# Patient Record
Sex: MALE | Race: BLACK OR AFRICAN AMERICAN | NOT HISPANIC OR LATINO | Employment: FULL TIME | URBAN - METROPOLITAN AREA
[De-identification: names, ages, dates, MRNs, and addresses within clinical notes are randomized per-mention and may not be internally consistent; named-entity substitution may affect disease eponyms.]

---

## 2017-02-03 RX ORDER — PREDNISONE 10 MG/1
TABLET ORAL
Qty: 35 TABLET | Refills: 0 | Status: SHIPPED | OUTPATIENT
Start: 2017-02-03 | End: 2017-05-02 | Stop reason: SDUPTHER

## 2017-02-03 NOTE — TELEPHONE ENCOUNTER
----- Message from Josi Francisco MA sent at 2/3/2017  8:49 AM CST -----  Contact: Self/[phone]  Type: Rx    Name of medication(s): Prednisone    Is this a refill? New rx?refilll    Who prescribed medication?    [facility]arm[facility] Phone[facility]    Comments:Pt stated that his sinus is flared and that he really need his medication. Please advise.    Thanks

## 2017-02-13 ENCOUNTER — OFFICE VISIT (OUTPATIENT)
Dept: ALLERGY | Facility: CLINIC | Age: 37
End: 2017-02-13
Payer: COMMERCIAL

## 2017-02-13 VITALS
BODY MASS INDEX: 25.56 KG/M2 | DIASTOLIC BLOOD PRESSURE: 70 MMHG | HEIGHT: 73 IN | WEIGHT: 192.88 LBS | SYSTOLIC BLOOD PRESSURE: 102 MMHG | HEART RATE: 64 BPM

## 2017-02-13 DIAGNOSIS — J33.9 NASAL POLYPOSIS: ICD-10-CM

## 2017-02-13 DIAGNOSIS — J45.20 ASTHMA, INTERMITTENT, UNCOMPLICATED: Primary | ICD-10-CM

## 2017-02-13 DIAGNOSIS — H10.13 CONJUNCTIVITIS, ALLERGIC, BILATERAL: ICD-10-CM

## 2017-02-13 DIAGNOSIS — J30.89 NON-SEASONAL ALLERGIC RHINITIS DUE TO OTHER ALLERGIC TRIGGER: ICD-10-CM

## 2017-02-13 PROCEDURE — 99999 PR PBB SHADOW E&M-EST. PATIENT-LVL III: CPT | Mod: PBBFAC,,, | Performed by: ALLERGY & IMMUNOLOGY

## 2017-02-13 PROCEDURE — 99214 OFFICE O/P EST MOD 30 MIN: CPT | Mod: S$GLB,,, | Performed by: ALLERGY & IMMUNOLOGY

## 2017-02-13 NOTE — PROGRESS NOTES
Sarthak Caballero returns to clinic today for continued evaluation of allergic rhinitis, conjunctivitis, asthma, and is polyposis. He was last seen November 17, 2016.    Since then he has been taking Oralair daily. He has tolerated this well without any difficulty and is symptoms have improved.     He is having very little rhinitis. He is able to breathe through his nose. At the very beginning of February he had a few days where he was not able to do so.    He has continued his Advair. He has not needed any albuterol. He denies any cough, wheezing, or shortness of breath.    His conjunctivitis has been well controlled.    He has an EpiPen at home. He thinks that it is up-to-date.    He continues to use his budesonide rinses daily.    [facility] ALLERGY QUESTIONNAIRE SHORT 2/13/2017   Are you taking any new medications since your last visit? No   Constitution: No symptoms   Head or facial pain: Sinus pressure   Eyes: No symptoms   Ears: Itching, Fullness   Nose: Nasal polyps, Blocked nose   Throat: No symptoms   Sinuses: No symptoms   Lungs: Use of inhalers   Skin: No symptoms   Cardiovascular: No symptoms   Gastrointestinal: No symptoms   Genital/ urinary No symptoms   Musculoskeletal: No symptoms   Neurologic: No symptoms   Endocrine: No symptoms   Hematologic: No symptoms     Physical Examination:  General: Well-developed, well-nourished, no acute distress.  Head: No sinus tenderness.  Eyes: Conjunctivae:  No bulbar or palpebral conjunctival injection.  Ears: EAC's clear.  TM's clear.  No pre-auricular nodes.  Nose: Nasal Mucosa:  Pink.  Septum: No apparent deviation.  Turbinates:  Normal.  Polyps/Mass: Bilateral mucus present.  Teeth/Gums:  No bleeding noted.  Oropharynx: No exudates.  Neck: Supple without thyromegaly. No cervical lymphadenopathy.    Respiratory/Chest: Effort: Good.  Auscultation:  Clear bilaterally.  Skin: Good turgor.  No urticaria or angioedema.  Neuro/Psych: Oriented x 3.    Laboratory  2014:  IgE level:  467.  ImmunoCAP:  Class IV:  Bahia.  Class III:  Vincent, Pecan.  Class II:  Oak, RW.  Class I:  DM, ME.    IgA:  127.  Ig.  IgM:  82.  Pneumococcal titers: Not protective.    Pneumovax was given on 2014.    Laboratory 3/11/2014:  Pneumococcal titers: Improved but not protected.    Laboratory 2014:  Pneumococcal titers: Protective.    Assessment:  1. Allergic rhinitis.  2. Allergic conjunctivitis.  3. Allergic asthma, controlled.  4. Nasal polyposis.  5. S/P FESS with polypectomy 2013 and 2015.  6. Low pneumococcal titer with Pneumovax 2014, protective with Prevnar 3/25/2014.    Recommendations:  1. Continue Oralair.  2. Continue budesonide nasal rinses.  3. Continue Advair.  4. OTC antihistamines as needed.  5. Albuterol as needed.  6. Return to clinic in the months or sooner if needed.

## 2017-05-01 ENCOUNTER — TELEPHONE (OUTPATIENT)
Dept: ALLERGY | Facility: CLINIC | Age: 37
End: 2017-05-01

## 2017-05-01 NOTE — TELEPHONE ENCOUNTER
Called patient, left msg on voicemail that Dr. Nieto will be in tomorrow and I will call him after discussing with Dr. Nieto to see if he can come in at 4:40 tomorrow.  Wanted to find out what his symptoms were as well.

## 2017-05-02 ENCOUNTER — HOSPITAL ENCOUNTER (OUTPATIENT)
Dept: RADIOLOGY | Facility: HOSPITAL | Age: 37
Discharge: HOME | End: 2017-05-02
Attending: ALLERGY & IMMUNOLOGY
Payer: COMMERCIAL

## 2017-05-02 ENCOUNTER — OFFICE VISIT (OUTPATIENT)
Dept: ALLERGY | Facility: CLINIC | Age: 37
End: 2017-05-02
Payer: COMMERCIAL

## 2017-05-02 VITALS
WEIGHT: 197.56 LBS | BODY MASS INDEX: 26.18 KG/M2 | SYSTOLIC BLOOD PRESSURE: 110 MMHG | HEIGHT: 73 IN | HEART RATE: 84 BPM | DIASTOLIC BLOOD PRESSURE: 68 MMHG

## 2017-05-02 DIAGNOSIS — J30.89 NON-SEASONAL ALLERGIC RHINITIS DUE TO OTHER ALLERGIC TRIGGER: ICD-10-CM

## 2017-05-02 DIAGNOSIS — J33.9 NASAL POLYPOSIS: ICD-10-CM

## 2017-05-02 DIAGNOSIS — R05.9 COUGH: ICD-10-CM

## 2017-05-02 DIAGNOSIS — J45.901 ASTHMA WITH ACUTE EXACERBATION, UNSPECIFIED ASTHMA SEVERITY: ICD-10-CM

## 2017-05-02 DIAGNOSIS — J45.20 ASTHMA, INTERMITTENT, UNCOMPLICATED: ICD-10-CM

## 2017-05-02 DIAGNOSIS — H10.13 CONJUNCTIVITIS, ALLERGIC, BILATERAL: ICD-10-CM

## 2017-05-02 DIAGNOSIS — J06.9 UPPER RESPIRATORY TRACT INFECTION, UNSPECIFIED TYPE: ICD-10-CM

## 2017-05-02 DIAGNOSIS — R05.9 COUGH: Primary | ICD-10-CM

## 2017-05-02 DIAGNOSIS — J40 BRONCHITIS: ICD-10-CM

## 2017-05-02 PROCEDURE — 99999 PR PBB SHADOW E&M-EST. PATIENT-LVL III: CPT | Mod: 25,PBBFAC,, | Performed by: ALLERGY & IMMUNOLOGY

## 2017-05-02 PROCEDURE — 99214 OFFICE O/P EST MOD 30 MIN: CPT | Mod: S$GLB,,, | Performed by: ALLERGY & IMMUNOLOGY

## 2017-05-02 PROCEDURE — 71020 XR CHEST PA AND LATERAL: CPT | Mod: 26,,, | Performed by: RADIOLOGY

## 2017-05-02 PROCEDURE — 71020 XR CHEST PA AND LATERAL: CPT | Mod: TC

## 2017-05-02 PROCEDURE — 1160F RVW MEDS BY RX/DR IN RCRD: CPT | Mod: S$GLB,,, | Performed by: ALLERGY & IMMUNOLOGY

## 2017-05-02 RX ORDER — ALBUTEROL SULFATE 90 UG/1
2 AEROSOL, METERED RESPIRATORY (INHALATION) EVERY 4 HOURS PRN
Qty: 1 INHALER | Refills: 3 | Status: SHIPPED | OUTPATIENT
Start: 2017-05-02 | End: 2020-03-04 | Stop reason: SDUPTHER

## 2017-05-02 RX ORDER — PREDNISONE 10 MG/1
TABLET ORAL
Qty: 35 TABLET | Refills: 0 | Status: SHIPPED | OUTPATIENT
Start: 2017-05-02 | End: 2018-11-21

## 2017-05-02 RX ORDER — AMOXICILLIN AND CLAVULANATE POTASSIUM 875; 125 MG/1; MG/1
1 TABLET, FILM COATED ORAL EVERY 12 HOURS
Qty: 20 TABLET | Refills: 1 | Status: SHIPPED | OUTPATIENT
Start: 2017-05-02 | End: 2018-02-20

## 2017-05-02 RX ORDER — FLUTICASONE PROPIONATE AND SALMETEROL 500; 50 UG/1; UG/1
1 POWDER RESPIRATORY (INHALATION) 2 TIMES DAILY
Qty: 1 INHALER | Refills: 5 | Status: SHIPPED | OUTPATIENT
Start: 2017-05-02 | End: 2018-05-16 | Stop reason: SDUPTHER

## 2017-05-02 NOTE — PROGRESS NOTES
Sarthak Caballero returns to clinic today for continued evaluation of allergic rhinitis, conjunctivitis, asthma, and nasal polyposis. He was last seen February 13, 2017.    Since his last visit, he has been taking Oralair without any difficulty. He does think that this has helped. His rhinitis and conjunctivitis are better. He is tolerating this without any difficulty.    About 2 weeks ago he developed an upper respiratory infection with a cough that was productive of brown sputum. He then developed increased wheezing and shortness of breath.    He says that his rhinitis and conjunctivitis have been controlled. He is not having any nasal congestion.    He also had myalgias, fatigue, subjective fever, and chills that lasted several days. He attributed this to the change of weather.    He has been taking Symbicort twice a day.    He only had 9 puffs of albuterol and wanted to save it rather than take it.    He has been taking NyQuil instead.    He continues to use budesonide nasal rinses.    His primary care physician is Dr. Harris. He was told that today was the first available appointment for either Dr. Harris or myself. He did not ask for an urgent care appointment or to speak to a nurse.    He has been able to continue working as a .    [facility] ALLERGY QUESTIONNAIRE SHORT 2/13/2017   Are you taking any new medications since your last visit? No   Constitution: No symptoms   Head or facial pain: Sinus pressure   Eyes: No symptoms   Ears: Itching, Fullness   Nose: Nasal polyps, Blocked nose   Throat: No symptoms   Sinuses: No symptoms   Lungs: Use of inhalers   Skin: No symptoms   Cardiovascular: No symptoms   Gastrointestinal: No symptoms   Genital/ urinary No symptoms   Musculoskeletal: No symptoms   Neurologic: No symptoms   Endocrine: No symptoms   Hematologic: No symptoms     Physical Examination:  General: Well-developed, well-nourished, no acute distress.  Head: No sinus tenderness.  Eyes: Conjunctivae:  No  bulbar or palpebral conjunctival injection.  Ears: EAC's clear.  TM's clear.  No pre-auricular nodes.  Nose: Nasal Mucosa:  Pink.  Septum: No apparent deviation.  Turbinates:  Normal.  Polyps/Mass: Green mucus in the right nares.  Teeth/Gums:  No bleeding noted.  Oropharynx: No exudates.  Neck: Supple without thyromegaly. No cervical lymphadenopathy.    Respiratory/Chest: Effort: Good.  Auscultation:  Mild bilateral expiratory wheezes.  Skin: Good turgor.  No urticaria or angioedema.  Neuro/Psych: Oriented x 3.    Laboratory 2014:  IgE level:  467.  ImmunoCAP:  Class IV:  Bahia.  Class III:  Vincent, Pecan.  Class II:  Oak, RW.  Class I:  DM, ME.    IgA:  127.  Ig.  IgM:  82.  Pneumococcal titers: Not protective.    Pneumovax was given on 2014.    Laboratory 3/11/2014:  Pneumococcal titers: Improved but not protected.    Laboratory 2014:  Pneumococcal titers: Protective.    Assessment:  1. Allergic rhinitis.  2. Allergic conjunctivitis.  3. Allergic asthma, not controlled.  4. Nasal polyposis.  5. S/P FESS with polypectomy 2013 and 2015.  6. Low pneumococcal titer with Pneumovax 2014, protective with Prevnar 3/25/2014.  7. Viral URI with bronchitis.    Recommendations:  1. Continue Oralair.  2. Continue budesonide nasal rinses.  3. Continue Advair.  4. OTC antihistamines as needed.  5. Albuterol as needed.  6. Chest x-ray today.  7. Start prednisone taper at 60 mg a day for 3 days.  8. Augmentin 875 twice a day for 10 days.  9. Return to clinic in 3 days or sooner if needed.

## 2017-05-05 ENCOUNTER — OFFICE VISIT (OUTPATIENT)
Dept: ALLERGY | Facility: CLINIC | Age: 37
End: 2017-05-05
Payer: COMMERCIAL

## 2017-05-05 VITALS
HEART RATE: 82 BPM | OXYGEN SATURATION: 97 % | HEIGHT: 73 IN | BODY MASS INDEX: 26 KG/M2 | WEIGHT: 196.19 LBS | SYSTOLIC BLOOD PRESSURE: 106 MMHG | DIASTOLIC BLOOD PRESSURE: 82 MMHG

## 2017-05-05 DIAGNOSIS — H10.13 CONJUNCTIVITIS, ALLERGIC, BILATERAL: ICD-10-CM

## 2017-05-05 DIAGNOSIS — J33.9 NASAL POLYPOSIS: ICD-10-CM

## 2017-05-05 DIAGNOSIS — J45.20 ASTHMA, INTERMITTENT, UNCOMPLICATED: Primary | ICD-10-CM

## 2017-05-05 DIAGNOSIS — J32.9 CHRONIC SINUSITIS, UNSPECIFIED LOCATION: ICD-10-CM

## 2017-05-05 DIAGNOSIS — J30.89 NON-SEASONAL ALLERGIC RHINITIS DUE TO OTHER ALLERGIC TRIGGER: ICD-10-CM

## 2017-05-05 PROCEDURE — 99214 OFFICE O/P EST MOD 30 MIN: CPT | Mod: S$GLB,,, | Performed by: ALLERGY & IMMUNOLOGY

## 2017-05-05 PROCEDURE — 1160F RVW MEDS BY RX/DR IN RCRD: CPT | Mod: S$GLB,,, | Performed by: ALLERGY & IMMUNOLOGY

## 2017-05-05 PROCEDURE — 99999 PR PBB SHADOW E&M-EST. PATIENT-LVL III: CPT | Mod: PBBFAC,,, | Performed by: ALLERGY & IMMUNOLOGY

## 2017-05-05 NOTE — PROGRESS NOTES
Sarthak Caballero returns to clinic today for continued evaluation of allergic rhinitis, conjunctivitis, asthma, and nasal polyposis. He was last seen May 2, 2017.    After his last visit, he started taking antibiotics and prednisone. He now feels much better. His rhinitis and conjunctivitis have been controlled. He is no longer coughing or having any wheezing. He is not having any shortness of breath.    He does have bilateral ear fullness. He has this frequently when he flies. He is currently having pain in both ears.    He is no longer having any fever, myalgias, or fatigue.    He does have albuterol to use if needed.    He continues to use his budesonide rinses.    [facility] ALLERGY QUESTIONNAIRE SHORT 5/5/2017   Are you taking any new medications since your last visit? No   Constitution: No symptoms   Head or facial pain: No symptoms   Eyes: No symptoms   Ears: Fullness   Nose: No symptoms   Throat: No symptoms   Sinuses: No symptoms   Lungs: No symptoms   Skin: No symptoms   Cardiovascular: No symptoms   Gastrointestinal: No symptoms   Genital/ urinary No symptoms   Musculoskeletal: No symptoms   Neurologic: No symptoms   Endocrine: No symptoms   Hematologic: No symptoms     Physical Examination:  General: Well-developed, well-nourished, no acute distress.  Head: No sinus tenderness.  Eyes: Conjunctivae:  No bulbar or palpebral conjunctival injection.  Ears: EAC's clear.  TM's clear.  No pre-auricular nodes.  Nose: Nasal Mucosa:  Pink.  Septum: No apparent deviation.  Turbinates:  Normal.  Polyps/Mass: Green mucus in the right nares.  Teeth/Gums:  No bleeding noted.  Oropharynx: No exudates.  Neck: Supple without thyromegaly. No cervical lymphadenopathy.    Respiratory/Chest: Effort: Good.  Auscultation:  Mild bilateral expiratory wheezes.  Skin: Good turgor.  No urticaria or angioedema.  Neuro/Psych: Oriented x 3.    Laboratory 2/4/2014:  IgE level:  467.  ImmunoCAP:  Class IV:  Bahia.  Class III:  Vincent  Pecan.  Class II:  Oak, RW.  Class I:  DM, ME.    IgA:  127.  Ig.  IgM:  82.  Pneumococcal titers: Not protective.    Pneumovax was given on 2014.    Laboratory 3/11/2014:  Pneumococcal titers: Improved but not protected.    Laboratory 2014:  Pneumococcal titers: Protective.    Chest x-ray 2017: Normal.    Assessment:  1. Allergic rhinitis.  2. Allergic conjunctivitis.  3. Allergic asthma, not controlled.  4. Nasal polyposis.  5. S/P FESS with polypectomy 2013 and 2015.  6. Low pneumococcal titer with Pneumovax 2014, protective with Prevnar 3/25/2014.  7. Viral URI with bronchitis, resolving.  8. Eustachian tube dysfunction.    Recommendations:  1. Continue Oralair.  2. Continue budesonide nasal rinses.  3. Continue Advair.  4. OTC antihistamines as needed.  5. Albuterol as needed.  6. Afrin for short-term use only.  7. Finish prednisone taper at 60 mg a day for 3 days.  8. Finish Augmentin  9. Return to clinic in 3 months.    Eustachian tube dysfunction reviewed. Afrin use for short-term only reviewed.

## 2017-05-09 RX ORDER — ANTHOXANTHUM ODORATUM POLLEN, DACTYLIS GLOMERATA POLLEN, LOLIUM PERENNE POLLEN, PHLEUM PRATENSE POLLEN, AND POA PRATENSIS POLLEN 300; 300; 300; 300; 300 [IR]/1; [IR]/1; [IR]/1; [IR]/1; [IR]/1
TABLET, ORALLY DISINTEGRATING SUBLINGUAL
Qty: 30 TABLET | Refills: 4 | Status: SHIPPED | OUTPATIENT
Start: 2017-05-09 | End: 2018-02-20

## 2018-02-20 ENCOUNTER — OFFICE VISIT (OUTPATIENT)
Dept: ALLERGY | Facility: CLINIC | Age: 38
End: 2018-02-20
Payer: COMMERCIAL

## 2018-02-20 VITALS
SYSTOLIC BLOOD PRESSURE: 100 MMHG | BODY MASS INDEX: 26.09 KG/M2 | HEIGHT: 73 IN | DIASTOLIC BLOOD PRESSURE: 70 MMHG | WEIGHT: 196.88 LBS

## 2018-02-20 DIAGNOSIS — J30.1 ALLERGIC RHINITIS DUE TO POLLEN, UNSPECIFIED CHRONICITY, UNSPECIFIED SEASONALITY: Primary | ICD-10-CM

## 2018-02-20 DIAGNOSIS — J45.30 MILD PERSISTENT ASTHMA WITHOUT COMPLICATION: ICD-10-CM

## 2018-02-20 PROCEDURE — 99999 PR PBB SHADOW E&M-EST. PATIENT-LVL III: CPT | Mod: PBBFAC,,, | Performed by: STUDENT IN AN ORGANIZED HEALTH CARE EDUCATION/TRAINING PROGRAM

## 2018-02-20 PROCEDURE — 99214 OFFICE O/P EST MOD 30 MIN: CPT | Mod: S$GLB,,, | Performed by: STUDENT IN AN ORGANIZED HEALTH CARE EDUCATION/TRAINING PROGRAM

## 2018-02-20 PROCEDURE — 3008F BODY MASS INDEX DOCD: CPT | Mod: S$GLB,,, | Performed by: STUDENT IN AN ORGANIZED HEALTH CARE EDUCATION/TRAINING PROGRAM

## 2018-02-20 RX ORDER — MOMETASONE FUROATE 50 UG/1
3 SPRAY, METERED NASAL 2 TIMES DAILY
Qty: 3 EACH | Refills: 5 | Status: SHIPPED | OUTPATIENT
Start: 2018-02-20 | End: 2018-11-21

## 2018-02-20 RX ORDER — EPINEPHRINE 0.3 MG/.3ML
1 INJECTION SUBCUTANEOUS ONCE AS NEEDED
Qty: 1 ML | Refills: 0 | Status: SHIPPED | OUTPATIENT
Start: 2018-02-20 | End: 2020-01-24

## 2018-02-20 RX ORDER — PREDNISONE 20 MG/1
TABLET ORAL
Qty: 18 TABLET | Refills: 0 | Status: SHIPPED | OUTPATIENT
Start: 2018-02-20 | End: 2018-11-21

## 2018-02-20 NOTE — PATIENT INSTRUCTIONS
Prednisone (20mg tablets)     3 for 5 days     2 for 5 days     1 until directed to stop    Nasonex nasal spray:  3 squirts each nostril twice a day   Remember to aim out toward your ear.   Needs to be used regularly 5-14 days for full effect.    Orolair as soon as approved.    New EpiPen.

## 2018-02-20 NOTE — PROGRESS NOTES
Allergy Clinic Note  [facility]    Subjective:      Patient ID: Sarthak Caballero is a 37 y.o. male.    Chief Complaint: Asthma; Follow-up; and Breathing Problem    Allergy problem list  1. Allergic rhinitis.  2. Allergic conjunctivitis.  3. Allergic asthma, not controlled.  4. Nasal polyposis.  5. S/P FESS with polypectomy October 2013 and October 2015.  6. Low pneumococcal titer with Pneumovax 2/11/2014, protective with Prevnar 3/25/2014.  8. Eustachian tube dysfunction.       History of Present Illness: 37-year-old male with asthma and cough was last seen by Dr. Zen Nieto 5/5/2017.  He is here to day c/o of severe nasal blockage, requesting prednisone and Orolair.    He states he did well with sinus/polyp surgery followed by postop prednisone followed by Oralair.  Unfortunately he had to stop Oralair around June 2017 due to insurance/cost issues.  He has not been taking anything and c/o gradually worsening B/l nasal blockage and head fullness.    He present now in 2018 to see if he can get the Orolair restarted.    His history is also significant for well-controlled asthma.  He reports that on Advair Diskus 250/50 one puff twice a day he has no major or minor asthma symptoms and does not need his albuterol.  He states it's been several years since he's had an asthma flare.    No other problems or complaints    Additional History:   Past medical history is otherwise unremarkable. He is s/p sinus surgery x2. Client   reports that he has never smoked. He has never used smokeless tobacco.      Patient Active Problem List   Diagnosis    Asthma, intermittent    Conjunctivitis, allergic    Rhinitis, allergic    Nasal polyposis    Asthma    Environmental allergies    Chronic sinusitis     Current Outpatient Prescriptions on File Prior to Visit   Medication Sig Dispense Refill    albuterol 90 mcg/actuation inhaler Inhale 2 puffs into the lungs every 4 (four) hours as needed for Wheezing. 1 Inhaler 3  "   fluticasone-salmeterol 500-50 mcg/dose (ADVAIR DISKUS) 500-50 mcg/dose DsDv diskus inhaler Inhale 1 puff into the lungs 2 (two) times daily. 1 Inhaler 5    budesonide 1 mg/2 mL NbSp Inhale 1 ampule into the lungs 2 (two) times daily. 180 mL 0    predniSONE (DELTASONE) 10 MG tablet Take 4 daily x7 days, then 3 daily x2 days, then 2 daily x2 days, then 1 daily x1 day 39 tablet 0    predniSONE (DELTASONE) 10 MG tablet Day 1 60 mg Day 2 60 mg Day 3 60 mg Day 4 50 mg Day 5 40 mg Day 6 30 mg Day 7 20 mg Day 8 10 mg Day 9 10 mg Day 10 10 mg 35 tablet 0    [DISCONTINUED] amoxicillin-clavulanate 875-125mg (AUGMENTIN) 875-125 mg per tablet Take 1 tablet by mouth every 12 (twelve) hours. 20 tablet 1    [DISCONTINUED] ORALAIR 300 indx reactivity Subl PLACE 1 TABLET UNDER THE TONGUE ONCE DAILY. 30 tablet 4     No current facility-administered medications on file prior to visit.          Review of Systems   Constitutional: Negative for chills and fever.   HENT: Positive for congestion and sinus pain. Negative for nosebleeds.         Purulent nasal secretions for months   Respiratory: Negative for shortness of breath and wheezing.    Cardiovascular: Negative for chest pain.   Gastrointestinal: Negative for blood in stool and melena.   Genitourinary: Negative for hematuria.   Neurological: Negative for seizures and loss of consciousness.       Objective:   /70 (BP Location: Left arm, Patient Position: Sitting)   Ht 6' 1" (1.854 m)   Wt 89.3 kg (196 lb 13.9 oz)   BMI 25.97 kg/m²       Physical Exam   Constitutional: He is oriented to person, place, and time and well-developed, well-nourished, and in no distress.   HENT:   Right Ear: External ear normal.   Left Ear: External ear normal.   Eyes: Right eye exhibits no discharge. Left eye exhibits no discharge. No scleral icterus.   Severe nasal mucosa swelling bilaterally, with sides "kissing"   Neck: Neck supple.   Cardiovascular: Normal rate, regular rhythm and " intact distal pulses.    Pulmonary/Chest: Effort normal. No stridor. No respiratory distress.   Lymphadenopathy:     He has no cervical adenopathy.   Neurological: He is alert and oriented to person, place, and time.   Psychiatric: Affect and judgment normal.       Data:   Laboratory 2014:  IgE level:  467.  ImmunoCAP:  Class IV:  Bahia.  Class III:  Vincent, Pecan.  Class II:  Oak, RW.  Class I:  DM, ME.     IgA:  127.  Ig.  IgM:  82.  Pneumococcal titers: Not protective.     Pneumovax was given on 2014.     Laboratory 3/11/2014:  Pneumococcal titers: Improved but not protected.     Laboratory 2014:  Pneumococcal titers: Protective.     Chest x-ray 2017: Normal.           Assessment:     1. Allergic rhinitis due to pollen, unspecified chronicity, unspecified seasonality    2. Mild persistent asthma without complication        Plan:     Sarthak was seen today for asthma, follow-up and breathing problem.    Diagnoses and all orders for this visit:    Allergic rhinitis due to pollen - not adequately controlled   Plan pred burst followed by Nasonex with orolair as soon as prior authorization can be completed.     -     mometasone (NASONEX) 50 mcg/actuation nasal spray; 3 sprays by Nasal route 2 (two) times daily.  -     predniSONE (DELTASONE) 20 MG tablet; 3 tablets a day for 5 days, then 2 tablets until directed to stop for 5 days, then 1 tablet until directed to stop.  grass-orch-s.kamini-rye-Kblu-maru (ORALAIR) 300 indx reactivity Subl; Place 1 tablet under the tongue Daily.  -     grass-orch-s.kamini-rye-Kblu-maru (ORALAIR) 300 indx reactivity Subl; Place 1 tablet under the tongue Daily.  -     EPINEPHrine (EPIPEN) 0.3 mg/0.3 mL AtIn; Inject 0.3 mLs (0.3 mg total) into the muscle once as needed.    Mild persistent asthma without complication - well controlled  Continue Advair    Return in about two weeks with EpiPen and Orolair.  Plan to recheck nose.    Patient Instructions   Prednisone  (20mg tablets)     3 for 5 days     2 for 5 days     1 until directed to stop    Nasonex nasal spray:  3 squirts each nostril twice a day   Remember to aim out toward your ear.   Needs to be used regularly 5-14 days for full effect.    Orolair as soon as approved.    New EpiPen.     Coordination of care greater than 50% of visit.    Follow-up in about 2 weeks (around 3/6/2018).    Tamia Wasserman MD

## 2018-02-22 ENCOUNTER — TELEPHONE (OUTPATIENT)
Dept: ALLERGY | Facility: CLINIC | Age: 38
End: 2018-02-22

## 2018-02-22 NOTE — TELEPHONE ENCOUNTER
Spoke with pharmacy clarified med quantity----- Message from Fabrice Conn sent at 2/22/2018 12:49 PM CST -----  Contact: [facility]/[phone]    Patient states Pharmacy needs verification on the medication predniSONE (DELTASONE) 20 MG tablet. Please call and advise.Thank You

## 2018-02-22 NOTE — TELEPHONE ENCOUNTER
Telephone [facility] pharmacy order verified ----- Message from Kiarra Blair sent at 2/21/2018  3:09 PM CST -----  Contact: [facility]/[phone]  Pharmacy needs verification on the medication predniSONE (DELTASONE) 20 MG tablet. Please call and advise.      Thank You

## 2018-04-30 ENCOUNTER — OFFICE VISIT (OUTPATIENT)
Dept: SPORTS MEDICINE | Facility: CLINIC | Age: 38
End: 2018-04-30
Payer: COMMERCIAL

## 2018-04-30 ENCOUNTER — HOSPITAL ENCOUNTER (OUTPATIENT)
Dept: RADIOLOGY | Facility: HOSPITAL | Age: 38
Discharge: HOME | End: 2018-04-30
Attending: PHYSICIAN ASSISTANT
Payer: COMMERCIAL

## 2018-04-30 VITALS
WEIGHT: 205 LBS | SYSTOLIC BLOOD PRESSURE: 111 MMHG | DIASTOLIC BLOOD PRESSURE: 79 MMHG | HEART RATE: 65 BPM | HEIGHT: 73 IN | BODY MASS INDEX: 27.17 KG/M2

## 2018-04-30 DIAGNOSIS — M79.644 CHRONIC PAIN OF RIGHT THUMB: Primary | ICD-10-CM

## 2018-04-30 DIAGNOSIS — G89.29 CHRONIC PAIN OF RIGHT THUMB: ICD-10-CM

## 2018-04-30 DIAGNOSIS — G89.29 CHRONIC PAIN OF RIGHT THUMB: Primary | ICD-10-CM

## 2018-04-30 DIAGNOSIS — M79.644 CHRONIC PAIN OF RIGHT THUMB: ICD-10-CM

## 2018-04-30 PROCEDURE — 99203 OFFICE O/P NEW LOW 30 MIN: CPT | Mod: S$PBB,,, | Performed by: PHYSICIAN ASSISTANT

## 2018-04-30 PROCEDURE — 99999 PR PBB SHADOW E&M-EST. PATIENT-LVL III: CPT | Mod: PBBFAC,,, | Performed by: PHYSICIAN ASSISTANT

## 2018-04-30 PROCEDURE — 73130 X-RAY EXAM OF HAND: CPT | Mod: 26,RT,, | Performed by: RADIOLOGY

## 2018-04-30 PROCEDURE — 97760 ORTHOTIC MGMT&TRAING 1ST ENC: CPT | Mod: PBBFAC,PO | Performed by: PHYSICIAN ASSISTANT

## 2018-04-30 PROCEDURE — 73130 X-RAY EXAM OF HAND: CPT | Mod: TC,FY,PO,RT

## 2018-04-30 NOTE — PROGRESS NOTES
Subjective:          Chief Complaint: Sarthak Caballero is a 37 y.o. male who had concerns including Pain of the Right Hand.    Sarthak Caballero is a right handed HONG . The pain started 2 weeks ago while running drills. He reports jamming his thumb into another players thigh. Pain has improved. He reports that the pain is a 0 /10 aching, stabbing and nonradiating pain today and responding adequately to conservative measures which have included activity modifications, rest, and oral medication. Is affecting ADLs and limiting desired level of activity. Denies numbness, tingling, radiation, and neck pain or radicular symptoms.  Pain is 6 /10 at its worst    Mechanical symptoms: none  Worse with flexion  Better with rest.   Nocturnal symptoms: (--)    No previous surgeries on hand              Review of Systems   Constitution: Negative for chills and fever.   HENT: Negative for congestion and sore throat.    Eyes: Negative for discharge and double vision.   Cardiovascular: Negative for chest pain, palpitations and syncope.   Respiratory: Negative for cough and shortness of breath.    Endocrine: Negative for cold intolerance and heat intolerance.   Skin: Negative for dry skin and rash.   Musculoskeletal: Positive for joint pain, joint swelling and stiffness. Negative for falls, gout, muscle cramps, muscle weakness, myalgias and neck pain.   Gastrointestinal: Negative for abdominal pain, nausea and vomiting.   Neurological: Negative for focal weakness, numbness and paresthesias.       Pain Related Questions  Over the past 3 days, what was your average pain during activity? (I.e. running, jogging, walking, climbing stairs, getting dressed, ect.): 0  Over the past 3 days, what was your highest pain level?: 0  Over the past 3 days, what was your lowest pain level? : 0    Other  How many nights a week are you awakened by your affected body part?: 0  Was the patient's HEIGHT measured or patient reported?:  Patient Reported  Was the patient's WEIGHT measured or patient reported?: Measured      Objective:        General: Alpha is well-developed, well-nourished, appears stated age, in no acute distress, alert and oriented to time, place and person.     General    Constitutional: He is oriented to person, place, and time. He appears well-developed and well-nourished. No distress.   HENT:   Head: Normocephalic and atraumatic.   Right Ear: External ear normal.   Left Ear: External ear normal.   Nose: Nose normal.   Eyes: Pupils are equal, round, and reactive to light.   Neck: Normal range of motion. Neck supple.   Cardiovascular: Normal heart sounds and intact distal pulses.    Pulmonary/Chest: Effort normal and breath sounds normal. No respiratory distress.   Abdominal: Soft. Bowel sounds are normal.   Neurological: He is alert and oriented to person, place, and time. No cranial nerve deficit. Coordination normal.   Psychiatric: He has a normal mood and affect. His behavior is normal. Judgment and thought content normal.             Right Hand/Wrist Exam     Inspection   Scars: Hand -  absent  Effusion: Hand -  absent  Bruising: Hand -  absent  Deformity: Hand -  deformity    Pain   Hand - The patient exhibits pain of the thumb IP.    Swelling   Hand - The patient is swollen on the thumb IP.    Range of Motion     Wrist   Extension: normal   Flexion: normal   Pronation: normal   Supination: normal     Finger Flexion   MP Thumb: normal   DIP Thumb: 70     Finger Extension   MP Thumb: normal   DIP Thumb: 80    Tests   Phalens Sign: negative  Finkelstein: negative    Atrophy   Thenar:  negative  Hypothenar:  negative    Other     Neuorologic Exam    Median Distribution: normal  Ulnar Distribution: normal  Radial Distribution: normal      Left Hand/Wrist Exam     Inspection   Scars: Hand -  absent  Effusion: Hand -  absent  Bruising: Hand -  absent  Deformity: Hand -  absent    Range of Motion     Wrist   Extension: normal    Flexion: normal   Pronation: normal   Supination: normal     Tests   Phalens Sign: negative  Finkelstein: negative    Atrophy  Thenar:  Negative  Hypothenar:  negative    Other     Sensory Exam  Median Distribution: normal  Ulnar Distribution: normal  Radial Distribution: normal          Muscle Strength   Right Upper Extremity   Wrist Extension: 5/5/5   Wrist Flexion: 5/5/5   : 5/5/5   Thumb - APB: 5/5  Thumb - FPL: 5/5    Vascular Exam       Capillary Refill  Right Hand: normal capillary refill  Left Hand: normal capillary refill    Radiographic Findings 04/30/2018:    EXAMINATION:  XR HAND COMPLETE 3 VIEW RIGHT    CLINICAL HISTORY:  Pain in right finger(s)    TECHNIQUE:  PA, lateral, and oblique views of the right hand were performed.    COMPARISON:  None    FINDINGS:  Pain marker is directed at the lateral aspect of the proximal phalanx of the thumb.    I detect no fracture, dislocation, radiopaque retained foreign body, lytic or blastic lesion, erosion or chondrocalcinosis.    Please note that acute nondisplaced fracture can be radiographically occult until osteoclastic resorption occurs.  If this is a concern consider immobilization and repeat radiograph after an interval of 7-10 days or proceed to more sensitive imaging techniques such as MRI.    Xrays of the right hand were ordered and reviewed by me today. These findings were discussed and reviewed with the patient.            Assessment:       Encounter Diagnosis   Name Primary?    Chronic pain of right thumb Yes          Plan:        1. Ice compress to the affected area 2-3x a day for 15-20 minutes as needed for pain management.  2. Continue OTC NSAIDs as needed for pain management  3. 76423 - Gerson Butts PA-C, performed a custom orthotic / brace adjustment, fitting and training with the patient. The patient demonstrated understanding and proper care. This was performed for 15 minutes.   - Still experiencing pain in IP. Use splint only during  basketball to protect.   4. Work with Flako Wong ATC for ROM treatment.  5. RTC to see Graciela Valle MD if pain persist or refractory to conservative therapies.     All of the patient's questions were answered and the patient will contact us if they have any questions or concerns in the interim.            Patient questionnaires may have been collected.

## 2018-05-15 ENCOUNTER — TELEPHONE (OUTPATIENT)
Dept: SPORTS MEDICINE | Facility: CLINIC | Age: 38
End: 2018-05-15

## 2018-05-15 NOTE — TELEPHONE ENCOUNTER
Called patient. He is still having trouble gripping and some tingling. He has not started treatment with ATC. Reminded him to work with Flako Wong ATC for ROM. Will have him follow-up with Graciela Valle MD.     ----- Message from Sidney Thomas sent at 5/14/2018  2:21 PM CDT -----  Contact: Self/[phone]  Patient would like a call back to ask a few questions about his right thumb.

## 2018-05-16 ENCOUNTER — OFFICE VISIT (OUTPATIENT)
Dept: SPORTS MEDICINE | Facility: CLINIC | Age: 38
End: 2018-05-16
Payer: COMMERCIAL

## 2018-05-16 VITALS
HEIGHT: 73 IN | DIASTOLIC BLOOD PRESSURE: 77 MMHG | HEART RATE: 70 BPM | WEIGHT: 210 LBS | SYSTOLIC BLOOD PRESSURE: 116 MMHG | BODY MASS INDEX: 27.83 KG/M2

## 2018-05-16 DIAGNOSIS — S63.681D OTHER SPRAIN OF RIGHT THUMB, SUBSEQUENT ENCOUNTER: Primary | ICD-10-CM

## 2018-05-16 PROCEDURE — 99999 PR PBB SHADOW E&M-EST. PATIENT-LVL III: CPT | Mod: PBBFAC,,, | Performed by: ORTHOPAEDIC SURGERY

## 2018-05-16 PROCEDURE — 99214 OFFICE O/P EST MOD 30 MIN: CPT | Mod: S$PBB,,, | Performed by: ORTHOPAEDIC SURGERY

## 2018-05-16 RX ORDER — FLUTICASONE PROPIONATE AND SALMETEROL 50; 500 UG/1; UG/1
1 POWDER RESPIRATORY (INHALATION) 2 TIMES DAILY
Qty: 1 INHALER | Refills: 5 | Status: SHIPPED | OUTPATIENT
Start: 2018-05-16 | End: 2019-03-14 | Stop reason: SDUPTHER

## 2018-05-16 NOTE — PROGRESS NOTES
"CHIEF COMPLAINT: Right Hand pain.                                             HISTORY OF PRESENT ILLNESS:  The patient is a 37 y.o.  right hand dominant male who presents for evaluation of his right hand.     History of Trauma: 4/17/18, The pain started while running drills. He reports jamming his thumb into another players knee.  Occupation: [employer]   Pain Duration: 1 months  Pain Quality: achy  Pain Context:unchanged  Pain Timing: intermittent  Pain Location: DIP thumb   Aggrevating Factors: aching intermittently, pain with writing, twisting a cap, and also tender if something jams into his thumb  Previous Treatments: thumb splint/brace without relief, ice   Associated Signs and Symptoms: he notes swelling and in some spots it "feels numb"     Pain is affecting ADLs.     PAST MEDICAL HISTORY:   Past Medical History:   Diagnosis Date    Allergy     Asthma     Environmental allergies      PAST SURGICAL HISTORY:   Past Surgical History:   Procedure Laterality Date    SINUS SURGERY  10/2015    FESS x2     FAMILY HISTORY:   Family History   Problem Relation Age of Onset    Hypertension Mother     Heart disease Father     Cancer Maternal Aunt         Breast    Heart disease Maternal Uncle     Stroke Maternal Grandmother      SOCIAL HISTORY:   Social History     Social History    Marital status: Single     Spouse name: N/A    Number of children: N/A    Years of education: N/A     Occupational History    Not on file.     Social History Main Topics    Smoking status: Never Smoker    Smokeless tobacco: Never Used    Alcohol use No    Drug use: No    Sexual activity: Not Currently     Partners: Female     Other Topics Concern    Not on file     Social History Narrative    No narrative on file       MEDICATIONS:   Current Outpatient Prescriptions:     albuterol 90 mcg/actuation inhaler, Inhale 2 puffs into the lungs every 4 (four) hours as needed for Wheezing., Disp: 1 Inhaler, Rfl: " "3    fluticasone-salmeterol 500-50 mcg/dose (ADVAIR DISKUS) 500-50 mcg/dose DsDv diskus inhaler, Inhale 1 puff into the lungs 2 (two) times daily., Disp: 1 Inhaler, Rfl: 5    grass-orch-s.kamini-rye-Kblu-maru (ORALAIR) 300 indx reactivity Subl, Place 1 tablet under the tongue Daily., Disp: 30 tablet, Rfl: 11    grass-orch-s.kamini-rye-Kblu-maru (ORALAIR) 300 indx reactivity Subl, Place 1 tablet under the tongue Daily., Disp: 30 tablet, Rfl: 11    mometasone (NASONEX) 50 mcg/actuation nasal spray, 3 sprays by Nasal route 2 (two) times daily., Disp: 3 each, Rfl: 5    predniSONE (DELTASONE) 10 MG tablet, Take 4 daily x7 days, then 3 daily x2 days, then 2 daily x2 days, then 1 daily x1 day, Disp: 39 tablet, Rfl: 0    predniSONE (DELTASONE) 10 MG tablet, Day 1 60 mg Day 2 60 mg Day 3 60 mg Day 4 50 mg Day 5 40 mg Day 6 30 mg Day 7 20 mg Day 8 10 mg Day 9 10 mg Day 10 10 mg, Disp: 35 tablet, Rfl: 0    predniSONE (DELTASONE) 20 MG tablet, 3 tablets a day for 5 days, then 2 tablets until directed to stop for 5 days, then 1 tablet until directed to stop., Disp: 18 tablet, Rfl: 0    budesonide 1 mg/2 mL NbSp, Inhale 1 ampule into the lungs 2 (two) times daily., Disp: 180 mL, Rfl: 0    EPINEPHrine (EPIPEN) 0.3 mg/0.3 mL AtIn, Inject 0.3 mLs (0.3 mg total) into the muscle once as needed., Disp: 1 mL, Rfl: 0  ALLERGIES:   Review of patient's allergies indicates:   Allergen Reactions    Grass pollen-bermuda, standard Shortness Of Breath       VITAL SIGNS: /77   Pulse 70   Ht 6' 1" (1.854 m)   Wt 95.3 kg (210 lb)   BMI 27.71 kg/m²      Review of Systems   Constitution: Negative for chills, fever, weakness and weight loss.   HENT: Negative for congestion.   Cardiovascular: Negative for chest pain and dyspnea on exertion.   Respiratory: Negative for cough and shortness of breath.   Hematologic/Lymphatic: Does not bruise/bleed easily.   Skin: Negative for rash and suspicious lesions.   Musculoskeletal: see " HPI  Gastrointestinal: Negative for bowel incontinence, constipation,diarrhea, vomiting.   Genitourinary: Negative for bladder incontinence.   Neurological: Negative for numbness, paresthesias and sensory change.       PHYSICAL EXAMINATION:  General:  The patient is alert and oriented x 3.  Mood is pleasant.  Observation of ears, eyes and nose reveal no gross abnormalities.  No labored breathing observed.  Gait is coordinated. Patient can toe walk and heel walk without difficulty.       right Hand Exam    OBSERVATION:     Swelling  + thumb DIP  Deformity  none   Discoloration  none    Atrophy  none   Scars   none               Erythema                    none    TENDERNESS:   Radial:   DRUJ    Neg   Radial Styloid   Neg   Radial Shaft                            Neg   1st dorsal Compartment Neg   Daniel's Tubercle  Neg   Basal Joint Thumb  Neg   ECRL Tendon   Neg   FCR Tendon   Neg   Snuff Box   Neg   Lunate    Neg      Ulnar:   ECU Sheath   Neg   FCU Tendon   Neg   Pisiform   Neg   TFCC    Neg   Ulnar Styloid   Neg   Ulnar Shaft   Neg     Hand:   Palmar Fascia  Neg   Metacarpal   Neg   MCP    Neg   Phalanx   Neg   PIP    Neg   DIP    Neg   A1- Pulley   Neg   Flexor Tendons  Neg   Finger Tip   Neg         ROM: (AROM)   Right    Left            Wrist Flexion   80°       80°      Wrist Extension   75°      75°   Radial Deviation  30°     30°    Ulnar Deviation  30°      30°      Pronation   90     90   Supination   90    90    STRENGTH:    RIGHT    LEFT    Wrist Flexion   5/5    5/5    Wrist Extension  5/5    5/5   Hand    5/5    5/5   Opposition   5/5    5/5    SPECIAL TESTS:   Phalens      Neg   Durkan Carpal Compression    Neg   Tinel (wrist)      Neg   Finkelstein      Neg   Piano Arteaga (ulnar translation)    Neg   Hutchinson Scaphoid Shift    Neg   Nicanor Test      Normal   Froment Sign      Neg   CMC Grind      Neg   [place] (Intrinsic Tightness)    Neg                  EXTREMITY NEURO-VASCULAR EXAM    Sensation  grossly intact to light touch all dermatomal regions.    DTR 2+ Biceps, Triceps, BR and Negative Adams sign   Grossly intact motor function of AIN, PIN, Median, Ulnar , Radial nerves   Distal pulses radial and ulnar 2+, brisk cap refill, symmetric.    Compartments of forearm and hand are soft and compressible     NECK:  Painless FROM and spinous processes non-tender. Negative Spurlings sign.      OTHER FINDINGS:  Decreased ROM with tumb flexion at DIP    XRAYS: 4/30/2018  Hand series right,  were obtained and reviewed  No convincing fracture or dislocation is noted. The osseous structures appear well mineralized and well aligned    ASSESSMENT:   Right thumb DIP sprain, possible mallet sprain    PLAN:  stax splint 24 hrs/day.  RTC 3 weeks, possible MRI  I have discussed the nature of this problem with the patient today. We discussed both surgical and non-surgical options.

## 2018-06-08 ENCOUNTER — TELEPHONE (OUTPATIENT)
Dept: SPORTS MEDICINE | Facility: CLINIC | Age: 38
End: 2018-06-08

## 2018-06-08 NOTE — TELEPHONE ENCOUNTER
I called and left a voicemail providing the information for medical records as I do not see workers compensation in the patients insurance list

## 2018-06-08 NOTE — TELEPHONE ENCOUNTER
----- Message from Sidney Thomas sent at 6/8/2018  2:07 PM CDT -----  Contact: Diana/ Eddie/ [phone]  Diana was calling from [facility] she would like a call back to get information on the patient office visits and appt notes for the patient works comp.

## 2018-06-11 ENCOUNTER — TELEPHONE (OUTPATIENT)
Dept: SPORTS MEDICINE | Facility: CLINIC | Age: 38
End: 2018-06-11

## 2018-06-11 NOTE — TELEPHONE ENCOUNTER
"----- Message from Ludyailyn Nieto sent at 6/11/2018  9:51 AM CDT -----  Contact: pt [phone]  Pt called after receiving message of Dr Valle's book out for today.  Pt deferred 6/18, he stated that was too far out and also not a good day for him.  Pt stated they mentioned on the message that it could maybe be this Friday and that's what he wants.    Kept the pt on hold while I warm transferred him and when they answered "Sports Medicine" I explained the situation and was told I had to send a message to clinic, which I would have done without having the pt hold so long.    Pt was told that a message had to be sent.  Please call pt at [phone]    Thanks  devora  "

## 2018-06-15 ENCOUNTER — OFFICE VISIT (OUTPATIENT)
Dept: SPORTS MEDICINE | Facility: CLINIC | Age: 38
End: 2018-06-15
Payer: COMMERCIAL

## 2018-06-15 VITALS
DIASTOLIC BLOOD PRESSURE: 75 MMHG | BODY MASS INDEX: 27.83 KG/M2 | SYSTOLIC BLOOD PRESSURE: 120 MMHG | HEART RATE: 84 BPM | HEIGHT: 73 IN | WEIGHT: 210 LBS

## 2018-06-15 DIAGNOSIS — S63.681S OTHER SPRAIN OF RIGHT THUMB, SEQUELA: Primary | ICD-10-CM

## 2018-06-15 PROCEDURE — 3008F BODY MASS INDEX DOCD: CPT | Mod: CPTII,S$GLB,, | Performed by: ORTHOPAEDIC SURGERY

## 2018-06-15 PROCEDURE — 99999 PR PBB SHADOW E&M-EST. PATIENT-LVL III: CPT | Mod: PBBFAC,,, | Performed by: ORTHOPAEDIC SURGERY

## 2018-06-15 PROCEDURE — 99214 OFFICE O/P EST MOD 30 MIN: CPT | Mod: S$GLB,,, | Performed by: ORTHOPAEDIC SURGERY

## 2018-06-15 NOTE — PROGRESS NOTES
"CHIEF COMPLAINT: Right Hand pain.                                             HISTORY OF PRESENT ILLNESS:  The patient is a 37 y.o.  right hand dominant male who presents for evaluation of his right hand.     History of Trauma: 4/17/18, The pain started while running drills. He reports jamming his thumb into another players knee.  Occupation: [employer]   Pain Duration: 1 months  Pain Quality: achy  Pain Context:unchanged  Pain Timing: intermittent  Pain Location: DIP thumb   Aggrevating Factors: aching intermittently, pain with writing, twisting a cap, and also tender if something jams into his thumb  Previous Treatments: thumb splint/brace without relief, ice   Associated Signs and Symptoms: he notes swelling and in some spots it "feels numb"      Pain is affecting ADLs.      PAST MEDICAL HISTORY:        Past Medical History:   Diagnosis Date    Allergy      Asthma      Environmental allergies        PAST SURGICAL HISTORY:         Past Surgical History:   Procedure Laterality Date    SINUS SURGERY   10/2015     FESS x2      FAMILY HISTORY:         Family History   Problem Relation Age of Onset    Hypertension Mother      Heart disease Father      Cancer Maternal Aunt           Breast    Heart disease Maternal Uncle      Stroke Maternal Grandmother        SOCIAL HISTORY:   Social History   Social History            Social History    Marital status: Single       Spouse name: N/A    Number of children: N/A    Years of education: N/A      Occupational History    Not on file.            Social History Main Topics    Smoking status: Never Smoker    Smokeless tobacco: Never Used    Alcohol use No    Drug use: No    Sexual activity: Not Currently       Partners: Female           Other Topics Concern    Not on file          Social History Narrative    No narrative on file            MEDICATIONS:   Current Outpatient Prescriptions:     albuterol 90 mcg/actuation inhaler, Inhale 2 puffs " "into the lungs every 4 (four) hours as needed for Wheezing., Disp: 1 Inhaler, Rfl: 3    fluticasone-salmeterol 500-50 mcg/dose (ADVAIR DISKUS) 500-50 mcg/dose DsDv diskus inhaler, Inhale 1 puff into the lungs 2 (two) times daily., Disp: 1 Inhaler, Rfl: 5    grass-orch-s.kamini-rye-Kblu-maru (ORALAIR) 300 indx reactivity Subl, Place 1 tablet under the tongue Daily., Disp: 30 tablet, Rfl: 11    grass-orch-s.kamini-rye-Kblu-maru (ORALAIR) 300 indx reactivity Subl, Place 1 tablet under the tongue Daily., Disp: 30 tablet, Rfl: 11    mometasone (NASONEX) 50 mcg/actuation nasal spray, 3 sprays by Nasal route 2 (two) times daily., Disp: 3 each, Rfl: 5    predniSONE (DELTASONE) 10 MG tablet, Take 4 daily x7 days, then 3 daily x2 days, then 2 daily x2 days, then 1 daily x1 day, Disp: 39 tablet, Rfl: 0    predniSONE (DELTASONE) 10 MG tablet, Day 1 60 mg Day 2 60 mg Day 3 60 mg Day 4 50 mg Day 5 40 mg Day 6 30 mg Day 7 20 mg Day 8 10 mg Day 9 10 mg Day 10 10 mg, Disp: 35 tablet, Rfl: 0    predniSONE (DELTASONE) 20 MG tablet, 3 tablets a day for 5 days, then 2 tablets until directed to stop for 5 days, then 1 tablet until directed to stop., Disp: 18 tablet, Rfl: 0    budesonide 1 mg/2 mL NbSp, Inhale 1 ampule into the lungs 2 (two) times daily., Disp: 180 mL, Rfl: 0    EPINEPHrine (EPIPEN) 0.3 mg/0.3 mL AtIn, Inject 0.3 mLs (0.3 mg total) into the muscle once as needed., Disp: 1 mL, Rfl: 0  ALLERGIES:        Review of patient's allergies indicates:   Allergen Reactions    Grass pollen-bermuda, standard Shortness Of Breath         VITAL SIGNS: /77   Pulse 70   Ht 6' 1" (1.854 m)   Wt 95.3 kg (210 lb)   BMI 27.71 kg/m²       Review of Systems   Constitution: Negative for chills, fever, weakness and weight loss.   HENT: Negative for congestion.   Cardiovascular: Negative for chest pain and dyspnea on exertion.   Respiratory: Negative for cough and shortness of breath.   Hematologic/Lymphatic: Does not bruise/bleed " easily.   Skin: Negative for rash and suspicious lesions.   Musculoskeletal: see HPI  Gastrointestinal: Negative for bowel incontinence, constipation,diarrhea, vomiting.   Genitourinary: Negative for bladder incontinence.   Neurological: Negative for numbness, paresthesias and sensory change.         PHYSICAL EXAMINATION:  General:  The patient is alert and oriented x 3.  Mood is pleasant.  Observation of ears, eyes and nose reveal no gross abnormalities.  No labored breathing observed.  Gait is coordinated. Patient can toe walk and heel walk without difficulty.         RIGHT HAND EXAM       OBSERVATION:    Swelling                       + thumb DIP                Deformity                     none  Discoloration               none                            Atrophy                        none  Scars                           none                            Erythema                    none     TENDERNESS:  Radial:  DRUJ                                       Neg  Radial Styloid                          Neg  Radial Shaft                            Neg  1st dorsal Compartment         Neg  Daniel's Tubercle                      Neg  Basal Joint Thumb                  Neg  ECRL Tendon                         Neg  FCR Tendon                           Neg  Snuff Box                                Neg  Lunate                                     Neg  Ulnar:  ECU Sheath                            Neg  FCU Tendon                           Neg  Pisiform                                   Neg  TFCC                                       Neg  Ulnar Styloid                            Neg  Ulnar Shaft                              Neg     Hand:  Palmar Fascia             Neg  Metacarpal                              Neg  MCP                                        Neg  Phalanx                                   Neg  PIP                                          Neg  DIP                                          Neg  A1- Pulley                                 Neg  Flexor Tendons                       Neg  Finger Tip                                Neg        ROM: (AROM)                                    Right                                        Left                                                                 Wrist Flexion                           80°                                           80°     Wrist Extension                       75°                                           75°  Radial Deviation                      30°                                           30°   Ulnar Deviation                       30°                                           30°         Pronation                                 90                                            90  Supination                               90                                            90     STRENGTH:                                       RIGHT                                     LEFT   Wrist Flexion                           5/5                                           5/5         Wrist Extension                       5/5                                           5/5  Hand                                 5/5                                           5/5  Opposition                               5/5                                           5/5     SPECIAL TESTS:  Phalens                                                                       Neg  Durkan Carpal Compression                                      Neg  Tinel (wrist)                                                                 Neg  Finkelstein                                                                   Neg  Piano Arteaga (ulnar translation)                                      Neg  Hutchinson Scaphoid Shift                                               Neg  Nicanor Test                                                                    Normal  Froment Sign                                                               Neg  CMC Grind                                                                  Neg  [place] (Intrinsic Tightness)                                       Neg                                                                                                         EXTREMITY NEURO-VASCULAR EXAM   Sensation grossly intact to light touch all dermatomal regions.   DTR 2+ Biceps, Triceps, BR and Negative Adams sign  Grossly intact motor function of AIN, PIN, Median, Ulnar , Radial nerves  Distal pulses radial and ulnar 2+, brisk cap refill, symmetric.   Compartments of forearm and hand are soft and compressible  NECK:  Painless FROM and spinous processes non-tender. Negative Spurlings sign.       OTHER FINDINGS:  Decreased ROM with tumb flexion at DIP  Good extension strength     XRAYS: 4/30/2018  Hand series right,  were obtained and reviewed  No convincing fracture or dislocation is noted. The osseous structures appear well mineralized and well aligned     ASSESSMENT:   Right thumb DIP sprain, possible mallet sprain     PLAN:  OT  I have discussed the nature of this problem with the patient today. We discussed both surgical and non-surgical options.

## 2018-06-22 ENCOUNTER — TELEPHONE (OUTPATIENT)
Dept: SPORTS MEDICINE | Facility: CLINIC | Age: 38
End: 2018-06-22

## 2018-06-22 NOTE — TELEPHONE ENCOUNTER
----- Message from Sidney Thomas sent at 6/22/2018  8:18 AM CDT -----  Contact: Aarti CMS/ [phone] Linda Mckeon would like a call back to get information on the patient physical therapy for works compensation.

## 2018-06-29 ENCOUNTER — CLINICAL SUPPORT (OUTPATIENT)
Dept: REHABILITATION | Facility: HOSPITAL | Age: 38
End: 2018-06-29
Attending: ORTHOPAEDIC SURGERY
Payer: COMMERCIAL

## 2018-06-29 DIAGNOSIS — M25.541 PAIN IN THUMB JOINT WITH MOVEMENT OF RIGHT HAND: ICD-10-CM

## 2018-06-29 PROCEDURE — 97165 OT EVAL LOW COMPLEX 30 MIN: CPT

## 2018-06-29 PROCEDURE — 97110 THERAPEUTIC EXERCISES: CPT

## 2018-06-29 PROCEDURE — 97022 WHIRLPOOL THERAPY: CPT

## 2018-06-29 NOTE — PLAN OF CARE
[facility] Occupational Therapy  Initial Evaluation     Name: Sarthak Caballero  Clinic Number: [id number]    Therapy Diagnosis:   Encounter Diagnosis   Name Primary?    Pain in thumb joint with movement of right hand      Physician: Graciela Valle MD    Physician Orders: Eval and Treat    Medical Diagnosis: S63.681S (ICD-10-CM) - Other sprain of right thumb, sequela  Surgical Procedure and Date: N/A  Evaluation Date: 6/29/2018  Insurance: [employer]  Insurance Authorization period Expiration: 6/15/2019  Plan of Care Certification Period: 6/29/2018 to 8/29/2018    Visit # / Visits Authortized: 1 / 12  Time In:8:00  Time Out: 9:00  Total Billable Time: 15 minutes    Precautions: Standard    Subjective     Involved Side: right  Dominant Side: Right  Date of Onset: 4/27/2018  Mechanism of Injury: Jammed thumb in players thigh  History of Current Condition: hurts with movement  Imaging: Findings: I detect no fracture, dislocation, radiopaque retained foreign body, lytic or blastic lesion, erosion or chondrocalcinosis.  Previous Therapy: none    Patient's Goals for Therapy: Be able to write and brush without difficulty    Pain:  Functional Pain Scale Rating 0-10:   1/10 on average  1/10 at best  2/10 at worst  Location: right thumb  Description: Sharp  Aggravating Factors: movement    Easing Factors: rest    Occupation:    Working presently: employed  Duties: coaching     Functional Limitations/Social History:    Previous functional status includes: Independent with all ADLs.     Current FunctionalStatus   Home/Living environment : lives alone      Limitation of Functional Status as follows:   ADLs/IADLs:     - Feeding:Independent    - Bathing: Independent    - Dressing/Grooming: Independent    - Driving: Independent     Leisure: Sports: avoiding use of right hand due to injury      Past Medical History/Physical Systems Review:   Sarthak Caballero  has a past medical history of Allergy; Asthma; and  Environmental allergies.    Sarthak Caballero  has a past surgical history that includes Sinus surgery (10/2015).    Sarthak has a current medication list which includes the following prescription(s): advair diskus, albuterol, budesonide, epinephrine, grass-orch-s.kamini-rye-kblu-maru, grass-orch-s.kamini-rye-kblu-maru, mometasone, prednisone, prednisone, and prednisone.    Review of patient's allergies indicates:   Allergen Reactions    Grass pollen-bermuda, standard Shortness Of Breath          Objective   Observation/Appearance:  Skin intact and no bruising noted.    Edema. None noted      Hand ROM. Measured in degrees.   6/29/2018 6/29/2018    Left Right        Thumb: MP 0/40 0/40                  IP 0+/98 20/50       Rad ADD/ABD 0/65 0/65       Pal ADD/ABD 0/80 0/70          Opposition Base of 5th 1.5 cm     Sensation:  Occasional numbness on volar surface of right thumb IP     Strength (Dyanmometer) and Pinch Strength (Pinch Gauge)  Measured in pounds and psi. Average of three trials.   6/29/2018 6/29/2018    Left Right   Rung  110   Key Pinch 22 18   3pt Pinch 16 10   2pt Pinch 12 6           CMS Impairment/Limitation/Restriction for FOTO Hand Survey    Therapist reviewed FOTO scores for Sarthak Caballero on 6/29/2018.   FOTO documents entered into [employer] - see Media section.    Limitation Score: 47%  Category: Carrying    Current : CK = at least 40% but < 60% impaired, limited or restricted  Goal: CJ = at least 20% but < 40% impaired, limited or restricted           Treatment     Treatment Time In: 8:00  Treatment Time Out: 9:00  Total Treatment time separate from Evaluation time:15 min    Sarthak received the following supervised modalities after being cleared for contradictions for 15 minutes:   -Patient received fluidotherapy to right hand for 15 minutes to increase blood flow, circulation, desensitization, sensory re-education and for pain management      Alpha received therapeutic exercises for 15 minutes  including: thumb IP flex, composite flex, opposition, thumb circles/reverse circles, RA/PA  X 10 reps each.      Home Exercise Program/Education:  Issued HEP (see patient instructions in EMR) and educated on modality use for pain management . Exercises were reviewed and Sarthak was able to demonstrate them prior to the end of the session.   Pt received a written copy of exercises to perform at home. Sarthak demonstrated good  understanding of the education provided.  Pt was advised to perform these exercises free of pain, and to stop performing them if pain occurs.    Patient/Family Education: role of OT, goals for OT, scheduling/cancellations - pt verbalized understanding. Discussed insurance limitations with patient.        Assessment     Sarthak Caballero is a 38 y.o. male referred to outpatient occupational/hand therapy and presents with a medical diagnosis of right thumb sprain, resulting in stiffness and weakness and demonstrates limitations as described in the chart below. Following a brief medical record review it is determined that pt will benefit from occupational therapy services in order to maximize pain free and/or functional use of right hand..     The patient's rehab potential is Excellent.     Anticipated barriers to occupational therapy: none  Pt has no cultural, educational or language barriers to learning provided.    Profile and History Assessment of Occupational Performance Level of Clinical Decision Making Complexity Score   Occupational Profile:   Sarthak Caballero is a 38 y.o. male who lives alone and is currently employed as . Sarthak Cbaallero has difficulty with brushing teeth, writing and opening containers  affecting his/her daily functional abilities. His/her main goal for therapy is write and brush his teeth without difficulty.     Comorbidities:    has a past medical history of Allergy; Asthma; and Environmental allergies.        Medical and Therapy History Review:    Brief               Performance Deficits    Physical:  Joint Mobility  Muscle Power/Strength  Muscle Endurance  Pinch Strength    Strength, Fine Motor Control and pain    Cognitive:  No Deficits    Psychosocial:    No Deficits     Clinical Decision Making:  low    Assessment Process:  Problem-Focused Assessments    Modification/Need for Assistance:  Not Necessary    Intervention Selection:  Limited Treatment Options       low  Based on PMHX, co morbidities , data from assessments and functional level of assistance required with task and clinical presentation directly impacting function.       The following goals were discussed with the patient and patient is in agreement with them as to be addressed in the treatment plan.     Goals:   Short Term (4 weeks on 7/29/2018):  1)   Patient to be IND with HEP and modalities for pain management  2)   Increase ROM by 10-15 degrees in right thumb  to increase functional hand use for writing  3)   Increase  strength 5-10 lbs. to grasp objects  4)   Increase pinch 1-3 psis for opening containers and brushing teeth  5)   Pt will be able to write without difficutly    Long Term (by discharge):8/29/2018  1)   Pt will report 0 out of 10 pain with right hand use for writing and brushing teeth.  2)   Patient to score at 28% or less on FOTO to demonstrate improved perception of functional right hand use.  3)   Pt will return to prior level of function for ADLs and household management.       Plan   Certification Period/Plan of care expiration: 6/29/2018 to 8/29.2018.    Outpatient Occupational Therapy 2 times weekly for 8 weeks may include the following interventions: Paraffin, Fluidotherapy, Manual therapy/joint mobilizations, Modalities for pain management, Therapeutic exercises/activities. and Strengthening.    BRIAN Brunson, DESIT

## 2018-06-29 NOTE — PATIENT INSTRUCTIONS
[facility]  OCCUPATIONAL THERAPY  HOME EXERCISE PROGRAM                    Complete the following exercises with 10 repetitions each, 4-6x/day.                                                             BRIAN Brunson, DESIT  Certified Hand Therapist  Occupational Therapist

## 2018-07-11 ENCOUNTER — CLINICAL SUPPORT (OUTPATIENT)
Dept: REHABILITATION | Facility: HOSPITAL | Age: 38
End: 2018-07-11
Payer: COMMERCIAL

## 2018-07-11 DIAGNOSIS — M25.541 PAIN IN THUMB JOINT WITH MOVEMENT OF RIGHT HAND: ICD-10-CM

## 2018-07-11 PROCEDURE — L3933 FO W/O JOINTS CF: HCPCS

## 2018-07-11 PROCEDURE — 97110 THERAPEUTIC EXERCISES: CPT

## 2018-07-11 PROCEDURE — 97022 WHIRLPOOL THERAPY: CPT

## 2018-07-11 NOTE — PATIENT INSTRUCTIONS
[facility]  OCCUPATIONAL THERAPY  HOME EXERCISE PROGRAM     Complete the following strengthening program 2x/day.                _                        BRIAN Brunson CHT  Certified Hand Therapist  Occupational Therapist        [facility] Occupational Therapy  Orthosis Instructions and Proof of Delivery        Date: 7/11/2018  Name: Sarthak Caballero  MRN: [id number]  YOB: 1980  Referring Provider: Graciela Valle MD  Diagnosis: Right thumb sprain    Insurance: [employer]               [facility] Level II Code and Description   - static thumb finger orthosis    Orthosis Information  (x) Custom Fabricated              () Prefabricated  (x) Right                                    () Left                                         () Bilateral  ()x Static                                   () Static Progressive                  () Dynamic    Orthosis Instructions  () Wear the orthosis at all times                        (x) Wear the orthosis at night only  () Wear the orthosis as needed to minimize your symptoms  () Remove for hygiene, exercises, dressing changes    Cleaning and Maintenance  Keep your orthosis away from any heat sources. Do not leave in your car.  Keep your orthosis away from pets.  You may clean your orthosis with cool water and soap or a mild cleanser.  Your orthosis may need adjustments due to changes in your medical condition (swelling, dressing size, additional surgery, etc.)  Monitor your skin color and integrity.  Do not try to adjust the orthosis on your own.     If you have any questions or concerns, please contact the therapy office.     I, Sarthak Caballero , have personally received the above described orthosis along with instructions on the wear, care, and precautions related to this orthosis. I understand that I am responsible for payment to [facility] of my deductible, coinsurance, or other portion of my charges not paid in full by my insurance plans,  including any item that is not covered under the insurance plan.     Signature: _________________________________ Date: __________________    Therapist: BRIAN Brunson CHT

## 2018-07-11 NOTE — PROGRESS NOTES
Occupational Therapy Daily Treatment Note     Name: Sarthak Caballero  Clinic Number: [id number]    Therapy Diagnosis: No diagnosis found.  Physician: Graciela Valle MD    Physician Orders:  Eval and Treat   Medical Diagnosis: S63.681S (ICD-10-CM) - Other sprain of right thumb, sequela  Surgical Procedure and Date: N/A/  Evaluation Date: 6/29/2018  Insurance Authorization Period Expiration: 6/15/2019  Plan of Care Certification Period: 6/29/j2018 to 8/29/2018  Date of Return to MD:   8/01/2018    Visit # / Visits authorized: 2 / 12  Time In:4:05  Time Out: 4:50  Total Billable Time: 15 minutes  Charges: 1 TE, 1 fluido, 1     Precautions: Standard    Subjective     Pt reports: he was compliant with home exercise program given last session.   Response to previous treatment:Not much improvement  Functional change: no functional changes noted    Pain: 2/10  Location: right thumb      Objective     Observation/Appearance:  Skin intact and no bruising noted.     Edema. None noted        Hand ROM. Measured in degrees.    6/29/2018 6/29/2018 7/11/2018     Left Right Right            Thumb: MP 0/40 0/40 0/45(+5)                  IP 0+/98 20/50 20/55(+5)       Rad ADD/ABD 0/65 0/65 0/65(=)       Pal ADD/ABD 0/80 0/70 0/70 (=)          Opposition Base of 5th 1.5 cm 1.0      Sensation:  Occasional numbness on volar surface of right thumb IP      Strength (Dyanmometer) and Pinch Strength (Pinch Gauge)  Measured in pounds and psi. Average of three trials.    6/29/2018 6/29/2018     Left Right   Rung  110   Key Pinch 22 18   3pt Pinch 16 10   2pt Pinch 12 6               CMS Impairment/Limitation/Restriction for FOTO Hand Survey     Therapist reviewed FOTO scores for Sarthak Caballero on 6/29/2018.   FOTO documents entered into [employer] - see Media section.     Limitation Score: 47%  Category: Carrying     Current : CK = at least 40% but < 60% impaired, limited or restricted  Goal: CJ = at least 20% but <  40% impaired, limited or restricted                   Sarthak received the following supervised modalities after being cleared for contradictions for 15 minutes:   -Patient received fluidotherapy to right hand for 15 minutes to increase blood flow, circulation, desensitization, sensory re-education and for pain management.    Alpha received therapeutic exercises for 15 minutes including:  -thumb IP blocking, reverse blocking, thumb circles, opposition, pinky slides and RA/PA x 10 reps each. Pt also issued yellow putty for HEP to perform flowering 10 x, 5 logs ea (key and 3 pt) and thumb flex/ext digs x 10 reps    Fabricated custom static thumb finger orthotic with IP in extension for night use only (see Media section)    Home Exercises and Education Provided     Education provided:   - Orthotic wear, care and precautions, yellow putty HEP  - Progress towards goals     Written Home Exercises Provided: Orthotic wear and care schedule for maintaining thumb ext at night..  Exercises were reviewed and Sarthak was able to demonstrate them prior to the end of the session.  Alpha demonstrated good  understanding of the education provided.     Pt received a written copy of exercises to perform at home.   See EMR under patient instructions for exercises given.     Sarthak demonstrated good  understanding of the education provided.       Assessment     Pt would continue to benefit from skilled OT. Pt made minimal ROM improvement with his left thumb. Added thumb reverse blocks to gain more excursion of his EPL tendon. Advanced to yellow putty exercises for HEP. Custom fabricated  static thumb IP extension orthosis to wear at [facility] only.  Pt verbalized and demonstrated good understanding of exercise and wear, care and precautions of orthosis.    Sarthak is progressing well towards his goals and there are no updates to goals at this time. Pt prognosis is excellent.     Pt will continue to benefit from skilled outpatient occupational  therapy to address the deficits listed in the problem list on initial evaluation provide pt/family education and to maximize pt's level of independence in the home and community environment.     Anticipated barriers to occupational therapy: none    Pt's spiritual, cultural and educational needs considered and pt agreeable to plan of care and goals.    Goals:  Short Term (4 weeks on 7/29/2018):  1)   Patient to be IND with HEP and modalities for pain management  2)   Increase ROM by 10-15 degrees in right thumb  to increase functional hand use for writing  3)   Increase  strength 5-10 lbs. to grasp objects  4)   Increase pinch 1-3 psis for opening containers and brushing teeth  5)   Pt will be able to write without difficutly     Long Term (by discharge):8/29/2018  1)   Pt will report 0 out of 10 pain with right hand use for writing and brushing teeth.  2)   Patient to score at 28% or less on FOTO to demonstrate improved perception of functional right hand use.  3)   Pt will return to prior level of function for ADLs and household management.     Plan   Continue with OT. Advance as tolerated.  Discussed Plan of Care with patient: Yes  Updates/Grading for next session: BRIAN Rae, CHT

## 2018-07-13 ENCOUNTER — CLINICAL SUPPORT (OUTPATIENT)
Dept: REHABILITATION | Facility: HOSPITAL | Age: 38
End: 2018-07-13
Attending: ORTHOPAEDIC SURGERY
Payer: COMMERCIAL

## 2018-07-13 DIAGNOSIS — M25.541 PAIN IN THUMB JOINT WITH MOVEMENT OF RIGHT HAND: ICD-10-CM

## 2018-07-13 PROCEDURE — 97110 THERAPEUTIC EXERCISES: CPT

## 2018-07-13 PROCEDURE — 97022 WHIRLPOOL THERAPY: CPT

## 2018-07-13 NOTE — PROGRESS NOTES
"                            Occupational Therapy Daily Treatment Note     Name: Sarthak Caballero  Clinic Number: [id number]    Therapy Diagnosis:   Encounter Diagnosis   Name Primary?    Pain in thumb joint with movement of right hand      Physician: Graciela Valle MD    Physician Orders:  Eval and Treat   Medical Diagnosis: S63.681S (ICD-10-CM) - Other sprain of right thumb, sequela  Surgical Procedure and Date: N/A/  Evaluation Date: 6/29/2018  Insurance Authorization Period Expiration: 6/15/2019  Plan of Care Certification Period: 6/29/j2018 to 8/29/2018  Date of Return to MD:   8/01/2018    Visit # / Visits authorized: 3 / 12  Time In:8:30  Time Out: 8:25  Total Billable Time: 40 minutes  Charges: 3 TE, 1 fluido,     Precautions: Standard    Subjective     Pt reports: the orthosis is helping with extension of his IP jt  Response to previous treatment:: "I'm slowly getting better."  Functional change: no functional changes noted    Pain: 2/10  Location: right thumb      Objective     Observation/Appearance:  Skin intact and no bruising noted.     Edema. None noted        Hand ROM. Measured in degrees.    6/29/2018 6/29/2018 7/11/2018     Left Right Right            Thumb: MP 0/40 0/40 0/45(+5)                  IP 0+/98 20/50 20/55(+5)       Rad ADD/ABD 0/65 0/65 0/65(=)       Pal ADD/ABD 0/80 0/70 0/70 (=)          Opposition Base of 5th 1.5 cm 1.0      Sensation:  Occasional numbness on volar surface of right thumb IP      Strength (Dyanmometer) and Pinch Strength (Pinch Gauge)  Measured in pounds and psi. Average of three trials.    6/29/2018 6/29/2018     Left Right   Rung  110   Key Pinch 22 18   3pt Pinch 16 10   2pt Pinch 12 6               CMS Impairment/Limitation/Restriction for FOTO Hand Survey     Therapist reviewed FOTO scores for Sarthak Caballero on 6/29/2018.   FOTO documents entered into [employer] - see Media section.     Limitation Score: 47%  Category: Carrying     Current : CK = at least 40% but < " 60% impaired, limited or restricted  Goal: CJ = at least 20% but < 40% impaired, limited or restricted                   Sarthak received the following supervised modalities after being cleared for contradictions for 15 minutes:   -Patient received fluidotherapy to right hand for 15 minutes to increase blood flow, circulation, desensitization, sensory re-education and for pain management.    Alpha received therapeutic exercises for 40 minutes including:  -thumb IP blocking, reverse blocking, thumb circles, opposition, pinky slides and RA/PA x 10 reps each. Pt also performed yellow putty for grasping 2', flowering 10 x, 5 logs ea (key and 3 pt) and thumb flex/ext digs x 10 reps  Thumbcisor x 30 reps 2 RB  3 containers each med pom poms with blue CP (key and 3pt)      Home Exercises and Education Provided     Education provided: none today    - Progress towards goals       Assessment     Pt would continue to benefit from skilled OT.  Advanced strengthening activities with no increase in pain complaints.    Sarthak is progressing well towards his goals and there are no updates to goals at this time. Pt prognosis is excellent.     Pt will continue to benefit from skilled outpatient occupational therapy to address the deficits listed in the problem list on initial evaluation provide pt/family education and to maximize pt's level of independence in the home and community environment.     Anticipated barriers to occupational therapy: none    Pt's spiritual, cultural and educational needs considered and pt agreeable to plan of care and goals.    Goals:  Short Term (4 weeks on 7/29/2018):  1)   Patient to be IND with HEP and modalities for pain management  2)   Increase ROM by 10-15 degrees in right thumb  to increase functional hand use for writing  3)   Increase  strength 5-10 lbs. to grasp objects  4)   Increase pinch 1-3 psis for opening containers and brushing teeth  5)   Pt will be able to write without  difficutly     Long Term (by discharge):8/29/2018  1)   Pt will report 0 out of 10 pain with right hand use for writing and brushing teeth.  2)   Patient to score at 28% or less on FOTO to demonstrate improved perception of functional right hand use.  3)   Pt will return to prior level of function for ADLs and household management.     Plan: Re-assess next session.   Continue with OT. Advance as tolerated.  Discussed Plan of Care with patient: Yes  Updates/Grading for next session: issue red putty next session    BRIAN Brunson, CHT

## 2018-07-16 ENCOUNTER — CLINICAL SUPPORT (OUTPATIENT)
Dept: REHABILITATION | Facility: HOSPITAL | Age: 38
End: 2018-07-16
Payer: COMMERCIAL

## 2018-07-16 DIAGNOSIS — M25.541 PAIN IN THUMB JOINT WITH MOVEMENT OF RIGHT HAND: ICD-10-CM

## 2018-07-16 PROCEDURE — 97022 WHIRLPOOL THERAPY: CPT

## 2018-07-16 PROCEDURE — 97110 THERAPEUTIC EXERCISES: CPT

## 2018-07-16 NOTE — PROGRESS NOTES
"                            Occupational Therapy Daily Treatment Note     Name: Sarthak Caballero  Clinic Number: [id number]    Therapy Diagnosis:   No diagnosis found.  Physician: Graciela Valle MD    Physician Orders:  Eval and Treat   Medical Diagnosis: S63.681S (ICD-10-CM) - Other sprain of right thumb, sequela  Surgical Procedure and Date: N/A  Evaluation Date: 6/29/2018  Insurance Authorization Period Expiration: 6/15/2019  Plan of Care Certification Period: 6/29/j2018 to 8/29/2018  Date of Return to MD:   8/01/2018    Visit # / Visits authorized: 4 / 12  Time In: 11:00 am  Time Out: 12:00 pm  Total Billable Time: 60 minutes  Charges: 3 TE, 1 fluido    Precautions: Standard    Subjective     Pt reports: "No pain today."  Response to previous treatment:: Pt reports compliance with orthosis wear schedule.  Functional change: no functional changes noted    Pain: 0/10  Location: right thumb      Objective     Observation/Appearance:  Skin intact and no bruising noted.     Edema. None noted     Hand ROM. Measured in degrees.    6/29/2018 6/29/2018 7/11/2018 7/16/2018     Left Right Right Right             Thumb: MP 0/40 0/40 0/45(+5) 0/41(-4)                  IP 0+/98 20/50 20/55(+5) 18/53 (+2/-2)       Rad ADD/ABD 0/65 0/65 0/65(=) 56 (-9)       Pal ADD/ABD 0/80 0/70 0/70 (=) 66 (-4)          Opposition Base of 5th 1.5 cm 1.0 To SF MCP crease (1.5 cm from DPC)      Sensation:  Occasional numbness on volar surface of right thumb IP      Strength (Dyanmometer) and Pinch Strength (Pinch Gauge)  Measured in pounds and psi. Average of three trials.    6/29/2018 6/29/2018 7/16/2018     Left Right Right   Rung  110 105 (-5)   Arteaga Pinch 22 18 19 (+1)   3pt Pinch 16 10 9 (-1)   2pt Pinch 12 6 9 (+3)      CMS Impairment/Limitation/Restriction for FOTO Hand Survey     Therapist reviewed FOTO scores for Sarthak Caballero on 6/29/2018.   FOTO documents entered into [employer] - see Media section.     Limitation Score: 47%  Category: " Carrying     Current : CK = at least 40% but < 60% impaired, limited or restricted  Goal: CJ = at least 20% but < 40% impaired, limited or restricted       Sarthak received the following supervised modalities after being cleared for contradictions for 15 minutes:   -Patient received fluidotherapy to right hand for 15 minutes to increase blood flow, circulation, desensitization, sensory re-education and for pain management.    Alpha received therapeutic exercises for 40 minutes including:  -thumb IP blocking, reverse blocking, thumb circles, opposition, pinky slides and RA/PA x 15 reps each.     Pt also performed red putty for grasping 2', flowering 10x, 3 logs ea (key and 3 pt) (NT) and thumb flex/ext digs x 10 reps  Thumbcisor x 30 reps 2 RB  3 containers each med pom poms with blue CP (key and 3pt)    Custom orthotic adjusted to support R thumb IP joint.     Issued red putty for HEP upgrade.    Home Exercises and Education Provided     Education provided: none today    - Progress towards goals     Assessment     Sarthak presents to OT with a dx of R thumb sprain. He reports no pain today. Reassessed today and continued strengthening activities with no increase in pain complaints. Issued red putty for HEP upgrade. Pt would continue to benefit from skilled OT to enhance functional use of R hand.    Sarthak is progressing well towards his goals and there are no updates to goals at this time. Pt prognosis is excellent.     Pt will continue to benefit from skilled outpatient occupational therapy to address the deficits listed in the problem list on initial evaluation provide pt/family education and to maximize pt's level of independence in the home and community environment.     Anticipated barriers to occupational therapy: none    Pt's spiritual, cultural and educational needs considered and pt agreeable to plan of care and goals.    Goals:  Short Term (4 weeks on 7/29/2018):  1)   Patient to be IND with HEP and modalities  for pain management  2)   Increase ROM by 10-15 degrees in right thumb  to increase functional hand use for writing  3)   Increase  strength 5-10 lbs. to grasp objects  4)   Increase pinch 1-3 psis for opening containers and brushing teeth  5)   Pt will be able to write without difficutly     Long Term (by discharge):8/29/2018  1)   Pt will report 0 out of 10 pain with right hand use for writing and brushing teeth.  2)   Patient to score at 28% or less on FOTO to demonstrate improved perception of functional right hand use.  3)   Pt will return to prior level of function for ADLs and household management.     Plan   Continue with OT. Advance as tolerated.  Discussed Plan of Care with patient: Yes  Updates/Grading for next session: Advance as tolerated.    RUBENS Cárdenas

## 2018-07-18 ENCOUNTER — CLINICAL SUPPORT (OUTPATIENT)
Dept: REHABILITATION | Facility: HOSPITAL | Age: 38
End: 2018-07-18
Payer: COMMERCIAL

## 2018-07-18 DIAGNOSIS — M25.541 PAIN IN THUMB JOINT WITH MOVEMENT OF RIGHT HAND: ICD-10-CM

## 2018-07-18 PROCEDURE — 97110 THERAPEUTIC EXERCISES: CPT

## 2018-07-18 PROCEDURE — 97022 WHIRLPOOL THERAPY: CPT

## 2018-07-18 NOTE — PROGRESS NOTES
"                            Occupational Therapy Daily Treatment Note     Name: Sarthak Caballero  Clinic Number: [id number]    Therapy Diagnosis:   Encounter Diagnosis   Name Primary?    Pain in thumb joint with movement of right hand      Physician: Graciela Valle MD    Physician Orders:  Eval and Treat   Medical Diagnosis: S63.681S (ICD-10-CM) - Other sprain of right thumb, sequela  Surgical Procedure and Date: N/A  Evaluation Date: 6/29/2018  Insurance Authorization Period Expiration: 6/15/2019  Plan of Care Certification Period: 6/29/j2018 to 8/29/2018  Date of Return to MD:   8/01/2018    Visit # / Visits authorized: 4 / 12  Time In: 10:30 am  Time Out: 11:15 am  Total Billable Time: 30 minutes  Charges: 2 TE, 1 fluido    Precautions: Standard    Subjective     Pt reports: "My thumb feels weird." Pt states that he will be out of town all next week.  Response to previous treatment:: Pt reports compliance with orthosis wear schedule.  Functional change: no functional changes noted    Pain: 0/10  Location: right thumb      Objective     Observation/Appearance:  Skin intact and no bruising noted.     Edema. None noted     Hand ROM. Measured in degrees.    6/29/2018 6/29/2018 7/11/2018 7/16/2018     Left Right Right Right             Thumb: MP 0/40 0/40 0/45(+5) 0/41(-4)                  IP 0+/98 20/50 20/55(+5) 18/53 (+2/-2)       Rad ADD/ABD 0/65 0/65 0/65(=) 56 (-9)       Pal ADD/ABD 0/80 0/70 0/70 (=) 66 (-4)          Opposition Base of 5th 1.5 cm 1.0 To SF MCP crease (1.5 cm from DPC)      Sensation:  Occasional numbness on volar surface of right thumb IP      Strength (Dyanmometer) and Pinch Strength (Pinch Gauge)  Measured in pounds and psi. Average of three trials.    6/29/2018 6/29/2018 7/16/2018     Left Right Right   Rung  110 105 (-5)   Arteaga Pinch 22 18 19 (+1)   3pt Pinch 16 10 9 (-1)   2pt Pinch 12 6 9 (+3)      CMS Impairment/Limitation/Restriction for FOTO Hand Survey     Therapist reviewed FOTO " scores for Sarthak English on 6/29/2018.   FOTO documents entered into [employer] - see Media section.     Limitation Score: 39%  Category: Carrying     Current : CJ = at least 20% but < 40% impaired, limited or restricted  Goal: CJ = at least 20% but < 40% impaired, limited or restricted       Sarthak received the following supervised modalities after being cleared for contradictions for 15 minutes:   -Patient received fluidotherapy to right hand for 15 minutes to increase blood flow, circulation, desensitization, sensory re-education and for pain management.    Sarthak received therapeutic exercises for 40 minutes including:  -thumb IP blocking, reverse blocking, thumb circles, opposition, pinky slides and RA/PA x 15 reps each.     Pt also performed red putty for grasping 2', flowering 10x, 3 logs ea (key and 3 pt) (NT) and thumb flex/ext digs x 10 reps (NT at home)  Thumbcisor x 30 reps 2 RB  3 containers each med pom poms with blue CP (key and 3pt)  Green power web for thumb flex and ext 30 reps ea      Home Exercises and Education Provided     Education provided: none today    - Progress towards goals     Assessment     Sarthak presents to OT with a dx of R thumb sprain. He reports no pain today. Advanced strengthening activities with no increase in pain complaints. Pt improved on FOT by 8 points since his initial visit. Pt would continue to benefit from skilled OT to enhance functional use of R hand.    Sarthak is progressing well towards his goals and there are no updates to goals at this time. Pt prognosis is excellent.     Pt will continue to benefit from skilled outpatient occupational therapy to address the deficits listed in the problem list on initial evaluation provide pt/family education and to maximize pt's level of independence in the home and community environment.     Anticipated barriers to occupational therapy: none    Pt's spiritual, cultural and educational needs considered and pt agreeable to plan of care  and goals.    Goals:  Short Term (4 weeks on 7/29/2018):  1)   Patient to be IND with HEP and modalities for pain management  2)   Increase ROM by 10-15 degrees in right thumb  to increase functional hand use for writing  3)   Increase  strength 5-10 lbs. to grasp objects  4)   Increase pinch 1-3 psis for opening containers and brushing teeth  5)   Pt will be able to write without difficutly     Long Term (by discharge):8/29/2018  1)   Pt will report 0 out of 10 pain with right hand use for writing and brushing teeth.  2)   Patient to score at 28% or less on FOTO to demonstrate improved perception of functional right hand use.  3)   Pt will return to prior level of function for ADLs and household management.     Plan   Continue with OT. Advance as tolerated.  Discussed Plan of Care with patient: Yes  Updates/Grading for next session: Advance as tolerated.    BRIAN Brunson, CHT

## 2018-08-01 ENCOUNTER — OFFICE VISIT (OUTPATIENT)
Dept: SPORTS MEDICINE | Facility: CLINIC | Age: 38
End: 2018-08-01
Payer: COMMERCIAL

## 2018-08-01 VITALS
SYSTOLIC BLOOD PRESSURE: 108 MMHG | HEIGHT: 73 IN | HEART RATE: 71 BPM | WEIGHT: 210 LBS | DIASTOLIC BLOOD PRESSURE: 77 MMHG | BODY MASS INDEX: 27.83 KG/M2

## 2018-08-01 DIAGNOSIS — S63.681S OTHER SPRAIN OF RIGHT THUMB, SEQUELA: Primary | ICD-10-CM

## 2018-08-01 PROCEDURE — 99214 OFFICE O/P EST MOD 30 MIN: CPT | Mod: S$GLB,,, | Performed by: ORTHOPAEDIC SURGERY

## 2018-08-01 PROCEDURE — 3008F BODY MASS INDEX DOCD: CPT | Mod: CPTII,S$GLB,, | Performed by: ORTHOPAEDIC SURGERY

## 2018-08-01 PROCEDURE — 99999 PR PBB SHADOW E&M-EST. PATIENT-LVL III: CPT | Mod: PBBFAC,,, | Performed by: ORTHOPAEDIC SURGERY

## 2018-08-01 NOTE — PROGRESS NOTES
"CHIEF COMPLAINT: Right Hand pain.                                             HISTORY OF PRESENT ILLNESS:  The patient is a 37 y.o.  right hand dominant male who presents for evaluation of his right hand.     History of Trauma: 4/17/18, The pain started while running drills. He reports jamming his thumb into another players knee.  Occupation: [employer]   Pain Duration: 1 months  Pain Quality: achy  Pain Context:unchanged  Pain Timing: intermittent  Pain Location: DIP thumb   Aggrevating Factors: aching intermittently, pain with writing, twisting a cap, and also tender if something jams into his thumb  Previous Treatments: thumb splint/brace without relief, ice   Associated Signs and Symptoms: he notes swelling and in some spots it "feels numb"      Pain is affecting ADLs.     Improved right thumb pain, wears night extension splint     PAST MEDICAL HISTORY:        Past Medical History:   Diagnosis Date    Allergy      Asthma      Environmental allergies        PAST SURGICAL HISTORY:         Past Surgical History:   Procedure Laterality Date    SINUS SURGERY   10/2015     FESS x2      FAMILY HISTORY:         Family History   Problem Relation Age of Onset    Hypertension Mother      Heart disease Father      Cancer Maternal Aunt           Breast    Heart disease Maternal Uncle      Stroke Maternal Grandmother        SOCIAL HISTORY:   Social History   Social History            Social History    Marital status: Single       Spouse name: N/A    Number of children: N/A    Years of education: N/A      Occupational History    Not on file.            Social History Main Topics    Smoking status: Never Smoker    Smokeless tobacco: Never Used    Alcohol use No    Drug use: No    Sexual activity: Not Currently       Partners: Female           Other Topics Concern    Not on file          Social History Narrative    No narrative on file            MEDICATIONS:   Current Outpatient " "Prescriptions:     albuterol 90 mcg/actuation inhaler, Inhale 2 puffs into the lungs every 4 (four) hours as needed for Wheezing., Disp: 1 Inhaler, Rfl: 3    fluticasone-salmeterol 500-50 mcg/dose (ADVAIR DISKUS) 500-50 mcg/dose DsDv diskus inhaler, Inhale 1 puff into the lungs 2 (two) times daily., Disp: 1 Inhaler, Rfl: 5    grass-orch-s.kamini-rye-Kblu-maru (ORALAIR) 300 indx reactivity Subl, Place 1 tablet under the tongue Daily., Disp: 30 tablet, Rfl: 11    grass-orch-s.kamini-rye-Kblu-maru (ORALAIR) 300 indx reactivity Subl, Place 1 tablet under the tongue Daily., Disp: 30 tablet, Rfl: 11    mometasone (NASONEX) 50 mcg/actuation nasal spray, 3 sprays by Nasal route 2 (two) times daily., Disp: 3 each, Rfl: 5    predniSONE (DELTASONE) 10 MG tablet, Take 4 daily x7 days, then 3 daily x2 days, then 2 daily x2 days, then 1 daily x1 day, Disp: 39 tablet, Rfl: 0    predniSONE (DELTASONE) 10 MG tablet, Day 1 60 mg Day 2 60 mg Day 3 60 mg Day 4 50 mg Day 5 40 mg Day 6 30 mg Day 7 20 mg Day 8 10 mg Day 9 10 mg Day 10 10 mg, Disp: 35 tablet, Rfl: 0    predniSONE (DELTASONE) 20 MG tablet, 3 tablets a day for 5 days, then 2 tablets until directed to stop for 5 days, then 1 tablet until directed to stop., Disp: 18 tablet, Rfl: 0    budesonide 1 mg/2 mL NbSp, Inhale 1 ampule into the lungs 2 (two) times daily., Disp: 180 mL, Rfl: 0    EPINEPHrine (EPIPEN) 0.3 mg/0.3 mL AtIn, Inject 0.3 mLs (0.3 mg total) into the muscle once as needed., Disp: 1 mL, Rfl: 0  ALLERGIES:        Review of patient's allergies indicates:   Allergen Reactions    Grass pollen-bermuda, standard Shortness Of Breath         VITAL SIGNS: /77   Pulse 70   Ht 6' 1" (1.854 m)   Wt 95.3 kg (210 lb)   BMI 27.71 kg/m²       Review of Systems   Constitution: Negative for chills, fever, weakness and weight loss.   HENT: Negative for congestion.   Cardiovascular: Negative for chest pain and dyspnea on exertion.   Respiratory: Negative for cough and " shortness of breath.   Hematologic/Lymphatic: Does not bruise/bleed easily.   Skin: Negative for rash and suspicious lesions.   Musculoskeletal: see HPI  Gastrointestinal: Negative for bowel incontinence, constipation,diarrhea, vomiting.   Genitourinary: Negative for bladder incontinence.   Neurological: Negative for numbness, paresthesias and sensory change.         PHYSICAL EXAMINATION:  General:  The patient is alert and oriented x 3.  Mood is pleasant.  Observation of ears, eyes and nose reveal no gross abnormalities.  No labored breathing observed.  Gait is coordinated. Patient can toe walk and heel walk without difficulty.         RIGHT HAND EXAM       OBSERVATION:    Swelling                       + thumb DIP                Deformity                     none  Discoloration               none                            Atrophy                        none  Scars                           none                            Erythema                    none     TENDERNESS:  Radial:  DRUJ                                       Neg  Radial Styloid                          Neg  Radial Shaft                            Neg  1st dorsal Compartment         Neg  Daniel's Tubercle                      Neg  Basal Joint Thumb                  Neg  ECRL Tendon                         Neg  FCR Tendon                           Neg  Snuff Box                                Neg  Lunate                                     Neg  Ulnar:  ECU Sheath                            Neg  FCU Tendon                           Neg  Pisiform                                   Neg  TFCC                                       Neg  Ulnar Styloid                            Neg  Ulnar Shaft                              Neg     Hand:  Palmar Fascia             Neg  Metacarpal                              Neg  MCP                                        Neg  Phalanx                                   Neg  PIP                                          Neg  DIP                                           Neg  A1- Pulley                                Neg  Flexor Tendons                       Neg  Finger Tip                                Neg        ROM: (AROM)                                    Right                                        Left                                                                 Wrist Flexion                           80°                                           80°     Wrist Extension                       75°                                           75°  Radial Deviation                      30°                                           30°   Ulnar Deviation                       30°                                           30°         Pronation                                 90                                            90  Supination                               90                                            90     STRENGTH:                                       RIGHT                                     LEFT   Wrist Flexion                           5/5                                           5/5         Wrist Extension                       5/5                                           5/5  Hand                                 5/5                                           5/5  Opposition                               5/5                                           5/5     SPECIAL TESTS:  Phalens                                                                       Neg  Durkan Carpal Compression                                      Neg  Tinel (wrist)                                                                 Neg  Finkelstein                                                                   Neg  Piano Arteaga (ulnar translation)                                      Neg  Hutchinson Scaphoid Shift                                               Neg  Nicanor Test                                                                    Normal  Froment Sign                                                               Neg  CMC Grind                                                                  Neg  Jonathan (Intrinsic Tightness)                                       Neg                                                                                                         EXTREMITY NEURO-VASCULAR EXAM   Sensation grossly intact to light touch all dermatomal regions.   DTR 2+ Biceps, Triceps, BR and Negative Adams sign  Grossly intact motor function of AIN, PIN, Median, Ulnar , Radial nerves  Distal pulses radial and ulnar 2+, brisk cap refill, symmetric.   Compartments of forearm and hand are soft and compressible  NECK:  Painless FROM and spinous processes non-tender. Negative Spurlings sign.       OTHER FINDINGS:  Decreased ROM with thumb flexion at DIP  Good extension strength     XRAYS: 4/30/2018  Hand series right,  were obtained and reviewed  No convincing fracture or dislocation is noted. The osseous structures appear well mineralized and well aligned     ASSESSMENT:   Right thumb DIP sprain, mallet sprain, healing     PLAN:  OT cont  I have discussed the nature of this problem with the patient today. We discussed both surgical and non-surgical options.   RTC 6 weeks

## 2018-08-02 ENCOUNTER — CLINICAL SUPPORT (OUTPATIENT)
Dept: REHABILITATION | Facility: HOSPITAL | Age: 38
End: 2018-08-02
Payer: COMMERCIAL

## 2018-08-02 DIAGNOSIS — M25.541 PAIN IN THUMB JOINT WITH MOVEMENT OF RIGHT HAND: ICD-10-CM

## 2018-08-02 PROCEDURE — 97110 THERAPEUTIC EXERCISES: CPT

## 2018-08-02 PROCEDURE — 97022 WHIRLPOOL THERAPY: CPT

## 2018-08-02 NOTE — PROGRESS NOTES
"                            Occupational Therapy Daily Treatment Note     Name: Sarthak Caballero  Clinic Number: [id number]    Therapy Diagnosis:   Encounter Diagnosis   Name Primary?    Pain in thumb joint with movement of right hand      Physician: Graciela Valle MD    Physician Orders:  Eval and Treat   Medical Diagnosis: S63.681S (ICD-10-CM) - Other sprain of right thumb, sequela  Surgical Procedure and Date: N/A  Evaluation Date: 6/29/2018  Insurance Authorization Period Expiration: 6/15/2019  Plan of Care Certification Period: 6/29/j2018 to 8/29/2018  Date of Return to MD:   8/01/2018    Visit # / Visits authorized: 6 / 12  Time In: 11:05 AM  Time Out: 12:05 PM  Total Billable Time: 60 minutes  Charges: 3 TE, 1 fluido    Precautions: Standard    Subjective     Pt reports: "My thumb is getting better."  Response to previous treatment:: Pt reports compliance with orthosis wear schedule.  Functional change: no functional changes noted    Pain: 0/10  Location: right thumb      Objective     Observation/Appearance:  Skin intact and no bruising noted.     Edema. None noted     Hand ROM. Measured in degrees.    6/29/2018 6/29/2018 7/11/2018 7/16/2018 8/2/2018     Left Right Right Right Right              Thumb: MP 0/40 0/40 0/45(+5) 0/41(-4) 0/48                  IP 0+/98 20/50 20/55(+5) 18/53 (+2/-2) 16/63       Rad ADD/ABD 0/65 0/65 0/65(=) 56 (-9) 60       Pal ADD/ABD 0/80 0/70 0/70 (=) 66 (-4) 75          Opposition Base of 5th 1.5 cm 1.0 To SF MCP crease (1.5 cm from DPC) .5 cm      Sensation:  Occasional numbness on volar surface of right thumb IP      Strength (Dyanmometer) and Pinch Strength (Pinch Gauge)  Measured in pounds and psi. Average of three trials.    6/29/2018 6/29/2018 7/16/2018 8/2/2018     Left Right Right Right   Rung  110 105 (-5) 110(+5)   Key Pinch 22 18 19 (+1) 19(=)   3pt Pinch 16 10 9 (-1) 10(+1)   2pt Pinch 12 6 9 (+3) 9(=)      CMS Impairment/Limitation/Restriction for FOTO Hand " Survey     Therapist reviewed FOTO scores for Sarthak English on 7/18/2018.   FOTO documents entered into [employer] - see Media section.     Limitation Score: 39%  Category: Carrying     Current : CJ = at least 20% but < 40% impaired, limited or restricted  Goal: CJ = at least 20% but < 40% impaired, limited or restricted       Sarthak received the following supervised modalities after being cleared for contradictions for 15 minutes:   -Patient received fluidotherapy to right hand for 15 minutes to increase blood flow, circulation, desensitization, sensory re-education and for pain management.    Sarthak received therapeutic exercises for 40 minutes including:  -thumb IP blocking, reverse blocking, thumb circles, opposition, pinky slides and RA/PA x 10 reps each.     Pt also performed green putty for grasping 2', flowering 10x (red), 3 logs ea (key and 3 pt)and thumb flex/ext digs x 10 reps   Thumbcisor x 30 reps 2 RB  3 containers each med pom poms with blue CP (key and 3pt)  Green power web for thumb flex and ext 30 reps ea    Issued thumb IP extension splint [place] size M. Instructed pt to wear splint for 15 min intervals gradually increasing to 1 hour, 3 x daily. Pt demonstrated and verbalized good understanding of wear, care and precautions.       Home Exercises and Education Provided     Education provided: Issued green putty, continue with putty exercises under patient instruction.    - Progress towards goals :Good    Assessment     Sarthak presents to OT with a dx of R thumb sprain. He reports no pain today. Advanced strengthening activities with no increase in pain complaints. Pt continues to make slow steady progress with AROM of his thumb.  Pt would continue to benefit from skilled OT to enhance functional use of R hand.    Sarthak is progressing well towards his goals and there are no updates to goals at this time. Pt prognosis is excellent.     Pt will continue to benefit from skilled outpatient occupational therapy  to address the deficits listed in the problem list on initial evaluation provide pt/family education and to maximize pt's level of independence in the home and community environment.     Anticipated barriers to occupational therapy: none    Pt's spiritual, cultural and educational needs considered and pt agreeable to plan of care and goals.    Goals:  Short Term (4 weeks on 7/29/2018):  1)   Patient to be IND with HEP and modalities for pain management - MET  2)   Increase ROM by 10-15 degrees in right thumb  to increase functional hand use for writing - MET  3)   Increase  strength 5-10 lbs. to grasp objects - MET  4)   Increase pinch 1-3 psis for opening containers and brushing teeth- NOT MET, continuing  5)   Pt will be able to write without difficutly - NOT MET, continuing     Long Term (by discharge):8/29/2018  1)   Pt will report 0 out of 10 pain with right hand use for writing and brushing teeth.- MET  2)   Patient to score at 28% or less on FOTO to demonstrate improved perception of functional right hand use.  3)   Pt will return to prior level of function for ADLs and household management.     Plan   Continue with OT. Advance as tolerated.  Discussed Plan of Care with patient: Yes  Updates/Grading for next session: Advance as tolerated.    BRIAN Brunson, CHT

## 2018-08-13 ENCOUNTER — CLINICAL SUPPORT (OUTPATIENT)
Dept: REHABILITATION | Facility: HOSPITAL | Age: 38
End: 2018-08-13
Payer: COMMERCIAL

## 2018-08-13 DIAGNOSIS — M25.541 PAIN IN THUMB JOINT WITH MOVEMENT OF RIGHT HAND: ICD-10-CM

## 2018-08-13 PROCEDURE — 97110 THERAPEUTIC EXERCISES: CPT

## 2018-08-13 PROCEDURE — 97022 WHIRLPOOL THERAPY: CPT

## 2018-08-13 NOTE — PROGRESS NOTES
"                            Occupational Therapy Daily Treatment Note     Name: Sarthak Caballero  [facility] Number: [id number]    Therapy Diagnosis:   Encounter Diagnosis   Name Primary?    Pain in thumb joint with movement of right hand      Physician: Graciela Valle MD    Physician Orders:  Eval and Treat   Medical Diagnosis: S63.681S (ICD-10-CM) - Other sprain of right thumb, sequela  Surgical Procedure and Date: N/A  Evaluation Date: 6/29/2018  Insurance Authorization Period Expiration: 6/15/2019  Plan of Care Certification Period: 6/29/j2018 to 8/29/2018  Date of Return to MD:   8/01/2018    Visit # / Visits authorized: 7/ 12  Time In: 10:50 AM  Time Out: 11:45 AM  Total Billable Time: 55 minutes  Charges: 3 TE, 1 fluido    Precautions: Standard    Subjective     Pt reports: "My thumb is getting better."  Response to previous treatment:: Pt reports compliance with orthosis wear schedule.  Functional change: Pt reports that he is able to open some containers if they are not too tight using his right hand.    Pain: 0/10  Location: right thumb      Objective     Observation/Appearance:  Skin intact and no bruising noted.     Edema. None noted     Hand ROM. Measured in degrees.    6/29/2018 6/29/2018 7/11/2018 7/16/2018 8/2/2018     Left Right Right Right Right              Thumb: MP 0/40 0/40 0/45(+5) 0/41(-4) 0/48                  IP 0+/98 20/50 20/55(+5) 18/53 (+2/-2) 16/63       Rad ADD/ABD 0/65 0/65 0/65(=) 56 (-9) 60       Pal ADD/ABD 0/80 0/70 0/70 (=) 66 (-4) 75          Opposition Base of 5th 1.5 cm 1.0 To SF MCP crease (1.5 cm from DPC) .5 cm      Sensation:  Occasional numbness on volar surface of right thumb IP      Strength (Dyanmometer) and Pinch Strength (Pinch Gauge)  Measured in pounds and psi. Average of three trials.    6/29/2018 6/29/2018 7/16/2018 8/2/2018     Left Right Right Right   Rung  110 105 (-5) 110(+5)   Key Pinch 22 18 19 (+1) 19(=)   3pt Pinch 16 10 9 (-1) 10(+1)   2pt Pinch 12 6 9 " (+3) 9(=)      CMS Impairment/Limitation/Restriction for FOTO Hand Survey     Therapist reviewed FOTO scores for Sarthak English on 7/18/2018.   FOTO documents entered into [email or web address] - see Media section.     Limitation Score: 39%  Category: Carrying     Current : CJ = at least 20% but < 40% impaired, limited or restricted  Goal: CJ = at least 20% but < 40% impaired, limited or restricted       Sarthak received the following supervised modalities after being cleared for contradictions for 15 minutes:   -Patient received fluidotherapy to right hand for 15 minutes to increase blood flow, circulation, desensitization, sensory re-education and for pain management.    Alpha received therapeutic exercises for 40 minutes including:  -thumb IP blocking, reverse blocking, thumb circles, opposition, pinky slides and RA/PA x 10 reps each.     Pt also performed green putty for grasping 2', flowering 10x (red), 3 logs ea (key and 3 pt)and thumb flex/ext digs x 10 reps   Thumbcisor x 30 reps 2 RB  3 containers each med pom poms with blue CP (key and 3pt)  Green power web for thumb flex and ext 30 reps ea           Home Exercises and Education Provided     Education provided: None today.    - Progress towards goals :Good    Assessment     Sarthak presents to OT with a dx of R thumb sprain. He reports no pain today. Continued strengthening activities with no increase in pain complaints.  Pt would continue to benefit from skilled OT to enhance functional use of R hand.    Sarthak is progressing well towards his goals and there are no updates to goals at this time. Pt prognosis is excellent.     Pt will continue to benefit from skilled outpatient occupational therapy to address the deficits listed in the problem list on initial evaluation provide pt/family education and to maximize pt's level of independence in the home and community environment.     Anticipated barriers to occupational therapy: none    Pt's spiritual, cultural and educational  needs considered and pt agreeable to plan of care and goals.    Goals:  Short Term (4 weeks on 7/29/2018):  1)   Patient to be IND with HEP and modalities for pain management - MET  2)   Increase ROM by 10-15 degrees in right thumb  to increase functional hand use for writing - MET  3)   Increase  strength 5-10 lbs. to grasp objects - MET  4)   Increase pinch 1-3 psis for opening containers and brushing teeth- NOT MET, continuing  5)   Pt will be able to write without difficutly - NOT MET, continuing     Long Term (by discharge):8/29/2018  1)   Pt will report 0 out of 10 pain with right hand use for writing and brushing teeth.- MET  2)   Patient to score at 28% or less on FOTO to demonstrate improved perception of functional right hand use.  3)   Pt will return to prior level of function for ADLs and household management.     Plan   Continue with OT.   Discussed Plan of Care with patient: Yes  Updates/Grading for next session: Advance as tolerated.    BRIAN Brunson, CHT

## 2018-08-15 ENCOUNTER — CLINICAL SUPPORT (OUTPATIENT)
Dept: REHABILITATION | Facility: HOSPITAL | Age: 38
End: 2018-08-15
Attending: INTERNAL MEDICINE
Payer: COMMERCIAL

## 2018-08-15 DIAGNOSIS — M25.541 PAIN IN THUMB JOINT WITH MOVEMENT OF RIGHT HAND: ICD-10-CM

## 2018-08-15 PROCEDURE — 97022 WHIRLPOOL THERAPY: CPT

## 2018-08-15 PROCEDURE — 97110 THERAPEUTIC EXERCISES: CPT

## 2018-08-15 NOTE — PROGRESS NOTES
"                            Occupational Therapy Daily Treatment Note     Name: Sarthak Caballero  [facility] Number: [id number]    Therapy Diagnosis:   Encounter Diagnosis   Name Primary?    Pain in thumb joint with movement of right hand      Physician: Graciela Valle MD    Physician Orders:  Eval and Treat   Medical Diagnosis: S63.681S (ICD-10-CM) - Other sprain of right thumb, sequela  Surgical Procedure and Date: N/A  Evaluation Date: 6/29/2018  Insurance Authorization Period Expiration: 6/15/2019  Plan of Care Certification Period: 6/29/j2018 to 8/29/2018  Date of Return to MD:   8/01/2018    Visit # / Visits authorized: 8/ 12  Time In: 10:55 AM  Time Out: 11:50 AM  Total Billable Time: 55 minutes  Charges: 3 TE, 1 fluido    Precautions: Standard    Subjective     Pt reports: "My thumb is getting better."  Response to previous treatment:: Pt reports compliance with orthosis wear schedule.  Functional change: Pt reports that he is able to open some containers if they are not too tight using his right hand.    Pain: 0/10  Location: right thumb      Objective     Observation/Appearance:  Skin intact and no bruising noted.     Edema. None noted     Hand ROM. Measured in degrees.    6/29/2018 6/29/2018 7/11/2018 7/16/2018 8/2/2018     Left Right Right Right Right              Thumb: MP 0/40 0/40 0/45(+5) 0/41(-4) 0/48                  IP 0+/98 20/50 20/55(+5) 18/53 (+2/-2) 16/63       Rad ADD/ABD 0/65 0/65 0/65(=) 56 (-9) 60       Pal ADD/ABD 0/80 0/70 0/70 (=) 66 (-4) 75          Opposition Base of 5th 1.5 cm 1.0 To SF MCP crease (1.5 cm from DPC) .5 cm      Sensation:  Occasional numbness on volar surface of right thumb IP      Strength (Dyanmometer) and Pinch Strength (Pinch Gauge)  Measured in pounds and psi. Average of three trials.    6/29/2018 6/29/2018 7/16/2018 8/2/2018     Left Right Right Right   Rung  110 105 (-5) 110(+5)   Key Pinch 22 18 19 (+1) 19(=)   3pt Pinch 16 10 9 (-1) 10(+1)   2pt Pinch 12 6 9 " (+3) 9(=)      Sarthak received the following supervised modalities after being cleared for contradictions for 15 minutes:   -Patient received fluidotherapy to right hand for 15 minutes to increase blood flow, circulation, desensitization, sensory re-education and for pain management.    Sarthak received therapeutic exercises for 40 minutes including:  -thumb IP blocking, reverse blocking, thumb circles, opposition, pinky slides and RA/PA x 10 reps each.     Pt also performed green putty for grasping 2', flowering 10x (red), 3 logs ea (key and 3 pt)and thumb flex/ext digs x 10 reps   Thumbcisor x 30 reps 2 RB  3 containers each med pom poms with blue CP (key and 3pt)  Green power web for thumb flex and ext 30 reps ea        Home Exercises and Education Provided     Education provided: None today.    - Progress towards goals :Good    Assessment     Sarthak presents to OT with a dx of R thumb sprain. He reports no pain today. Continued strengthening activities with no increase in pain complaints.  Pt would continue to benefit from skilled OT to enhance functional use of R hand.    Sarthak is progressing well towards his goals and there are no updates to goals at this time. Pt prognosis is excellent.     Pt will continue to benefit from skilled outpatient occupational therapy to address the deficits listed in the problem list on initial evaluation provide pt/family education and to maximize pt's level of independence in the home and community environment.     Anticipated barriers to occupational therapy: none    Pt's spiritual, cultural and educational needs considered and pt agreeable to plan of care and goals.    Goals:  Short Term (4 weeks on 7/29/2018):  1)   Patient to be IND with HEP and modalities for pain management - MET  2)   Increase ROM by 10-15 degrees in right thumb  to increase functional hand use for writing - MET  3)   Increase  strength 5-10 lbs. to grasp objects - MET  4)   Increase pinch 1-3 psis for  opening containers and brushing teeth- NOT MET, continuing  5)   Pt will be able to write without difficutly - NOT MET, continuing     Long Term (by discharge):8/29/2018  1)   Pt will report 0 out of 10 pain with right hand use for writing and brushing teeth.- MET  2)   Patient to score at 28% or less on FOTO to demonstrate improved perception of functional right hand use.  3)   Pt will return to prior level of function for ADLs and household management.     Plan: Re-assess next visit   Continue with OT.   Certification period: 6/2092018 to 8/29/2018    Discussed Plan of Care with patient: Yes  Updates/Grading for next session: Advance as tolerated.    BRIAN Brunson, CHT

## 2018-08-20 ENCOUNTER — CLINICAL SUPPORT (OUTPATIENT)
Dept: REHABILITATION | Facility: HOSPITAL | Age: 38
End: 2018-08-20
Payer: COMMERCIAL

## 2018-08-20 DIAGNOSIS — M25.541 PAIN IN THUMB JOINT WITH MOVEMENT OF RIGHT HAND: ICD-10-CM

## 2018-08-20 PROCEDURE — 97110 THERAPEUTIC EXERCISES: CPT

## 2018-08-20 PROCEDURE — 97022 WHIRLPOOL THERAPY: CPT

## 2018-08-20 NOTE — PROGRESS NOTES
"                            Occupational Therapy Daily Treatment Note     Name: Sarthak Caballero  [facility] Number: [id number]    Therapy Diagnosis:   Encounter Diagnosis   Name Primary?    Pain in thumb joint with movement of right hand      Physician: Graciela Valle MD    Physician Orders:  Eval and Treat   Medical Diagnosis: S63.681S (ICD-10-CM) - Other sprain of right thumb, sequela  Surgical Procedure and Date: N/A  Evaluation Date: 6/29/2018  Insurance Authorization Period Expiration: 6/15/2019  Plan of Care Certification Period: 6/29/j2018 to 8/29/2018  Date of Return to MD:   9/12/2018    Visit # / Visits authorized: 9/ 12  Time In: 11:00  AM  Time Out: 11:55 AM  Total Billable Time: 55 minutes  Charges: 3 TE, 1 fluido    Precautions: Standard    Subjective     Pt reports: "I still have a little difficulty writing."  Response to previous treatment:: Pt reports compliance with orthosis wear schedule.  Functional change: Pt reports that he is able to open some containers if they are not too tight using his right hand.    Pain: 0/10  Location: right thumb      Objective     Observation/Appearance:  Skin intact and no bruising noted.     Edema. None noted     Hand ROM. Measured in degrees.    6/29/2018 6/29/2018 7/11/2018 7/16/2018 8/2/2018 8/20/2018     Left Right Right Right Right Right               Thumb: MP 0/40 0/40 0/45(+5) 0/41(-4) 0/48 0/48                  IP 0+/98 20/50 20/55(+5) 18/53 (+2/-2) 16/63 14/70       Rad ADD/ABD 0/65 0/65 0/65(=) 56 (-9) 60 60       Pal ADD/ABD 0/80 0/70 0/70 (=) 66 (-4) 75 75          Opposition Base of 5th 1.5 cm 1.0 To SF MCP crease (1.5 cm from DPC) .5 cm Base of 5th      Sensation:  Occasional numbness on volar surface of right thumb IP      Strength (Dyanmometer) and Pinch Strength (Pinch Gauge)  Measured in pounds and psi. Average of three trials.    6/29/2018 6/29/2018 7/16/2018 8/2/2018 8/20/2018     Left Right Right Right Right   Rung  110 105 (-5) 110(+5) 110(=) "   [place] Pinch 22 18 19 (+1) 19(=) 20(+1)   3pt Pinch 16 10 9 (-1) 10(+1) 11(+1)   2pt Pinch 12 6 9 (+3) 9(=) 9(=)      Sarthak received the following supervised modalities after being cleared for contradictions for 15 minutes:   -Patient received fluidotherapy to right hand for 15 minutes to increase blood flow, circulation, desensitization, sensory re-education and for pain management.    Sarthak received therapeutic exercises for 40 minutes including:  -thumb IP blocking, reverse blocking, thumb circles, opposition, pinky slides and RA/PA x 10 reps each.     Pt also performed green putty for grasping 2', flowering 10x (red), 3 logs ea (key and 3 pt)and thumb flex/ext digs x 10 reps   Thumbcisor x 30 reps 2 RB  3 containers each med pom poms with blue CP (key and 3pt)  Green power web for thumb flex and ext 30 reps ea  Digi-extend with yellow band x 30 reps        Home Exercises and Education Provided     Education provided: None today.    - Progress towards goals :Good    Assessment     Sarthak presents to OT with a dx of R thumb sprain. He reports no pain today. Pt's AROM and pinch strength are improving. Advanced strengthening activities with no increase in pain complaints.  Pt would continue to benefit from skilled OT to enhance functional use of R hand.    Sarthak is progressing well towards his goals and there are no updates to goals at this time. Pt prognosis is excellent.     Pt will continue to benefit from skilled outpatient occupational therapy to address the deficits listed in the problem list on initial evaluation provide pt/family education and to maximize pt's level of independence in the home and community environment.     Anticipated barriers to occupational therapy: none    Pt's spiritual, cultural and educational needs considered and pt agreeable to plan of care and goals.    Goals:  Short Term (4 weeks on 7/29/2018):  1)   Patient to be IND with HEP and modalities for pain management - MET  2)   Increase  ROM by 10-15 degrees in right thumb  to increase functional hand use for writing - MET  3)   Increase  strength 5-10 lbs. to grasp objects - MET  4)   Increase pinch 1-3 psis for opening containers and brushing teeth- NOT MET, continuing  5)   Pt will be able to write without difficutly - NOT MET, continuing     Long Term (by discharge):8/29/2018  1)   Pt will report 0 out of 10 pain with right hand use for writing and brushing teeth.- MET  2)   Patient to score at 28% or less on FOTO to demonstrate improved perception of functional right hand use.  3)   Pt will return to prior level of function for ADLs and household management.     Plan:    Continue with OT.   Certification period: 6/2092018 to 8/29/2018    Discussed Plan of Care with patient: Yes  Updates/Grading for next session: Advance as tolerated.    BRIAN Brunson, CHT

## 2018-08-22 ENCOUNTER — CLINICAL SUPPORT (OUTPATIENT)
Dept: REHABILITATION | Facility: HOSPITAL | Age: 38
End: 2018-08-22
Payer: COMMERCIAL

## 2018-08-22 DIAGNOSIS — M25.541 PAIN IN THUMB JOINT WITH MOVEMENT OF RIGHT HAND: ICD-10-CM

## 2018-08-22 PROCEDURE — 97022 WHIRLPOOL THERAPY: CPT

## 2018-08-22 PROCEDURE — 97110 THERAPEUTIC EXERCISES: CPT

## 2018-08-22 NOTE — PROGRESS NOTES
"                            Occupational Therapy Daily Treatment Note     Name: Sarthak Caballero  [facility] Number: [id number]    Therapy Diagnosis:   Encounter Diagnosis   Name Primary?    Pain in thumb joint with movement of right hand      Physician: Graciela Valle MD    Physician Orders:  Eval and Treat   Medical Diagnosis: S63.681S (ICD-10-CM) - Other sprain of right thumb, sequela  Surgical Procedure and Date: N/A  Evaluation Date: 6/29/2018  Insurance Authorization Period Expiration: 6/15/2019  Plan of Care Certification Period: 6/29/j2018 to 8/29/2018  Date of Return to MD:   9/12/2018    Visit # / Visits authorized: 10/ 12  Time In: 11:00  AM  Time Out: 11:55 AM  Total Billable Time: 55 minutes  Charges: 3 TE, 1 fluido    Precautions: Standard    Subjective     Pt reports: "I still have a little difficulty buttoning a shirt and opening containers."  Response to previous treatment:: Pt reports compliance with orthosis wear schedule.  Functional change: Pt reports that he is able to open some containers if they are not too tight using his right hand.    Pain: 0/10  Location: right thumb      Objective     Observation/Appearance:  Skin intact and no bruising noted.     Edema. None noted     Hand ROM. Measured in degrees.    6/29/2018 6/29/2018 7/11/2018 7/16/2018 8/2/2018 8/20/2018     Left Right Right Right Right Right               Thumb: MP 0/40 0/40 0/45(+5) 0/41(-4) 0/48 0/48                  IP 0+/98 20/50 20/55(+5) 18/53 (+2/-2) 16/63 14/70       Rad ADD/ABD 0/65 0/65 0/65(=) 56 (-9) 60 60       Pal ADD/ABD 0/80 0/70 0/70 (=) 66 (-4) 75 75          Opposition Base of 5th 1.5 cm 1.0 To SF MCP crease (1.5 cm from DPC) .5 cm Base of 5th      Sensation:  Occasional numbness on volar surface of right thumb IP      Strength (Dyanmometer) and Pinch Strength (Pinch Gauge)  Measured in pounds and psi. Average of three trials.    6/29/2018 6/29/2018 7/16/2018 8/2/2018 8/20/2018     Left Right Right Right Right   Key "  110 105 (-5) 110(+5) 110(=)   Key Pinch 22 18 19 (+1) 19(=) 20(+1)   3pt Pinch 16 10 9 (-1) 10(+1) 11(+1)   2pt Pinch 12 6 9 (+3) 9(=) 9(=)      Sarthak received the following supervised modalities after being cleared for contradictions for 15 minutes:   -Patient received fluidotherapy to right hand for 15 minutes to increase blood flow, circulation, desensitization, sensory re-education and for pain management.    Sarthak received therapeutic exercises for 40 minutes including:  -thumb IP blocking, reverse blocking, thumb circles, opposition, pinky slides and RA/PA x 10 reps each.     Pt also performed green putty for grasping 2', flowering 10x (red), 3 logs ea (key and 3 pt)and thumb flex/ext digs x 10 reps   Thumbcisor x 30 reps 2 RB  3 containers each med pom poms with blue CP (key and 3pt)  Green power web for thumb flex and ext 30 reps ea  Digi-extend with yellow band x 30 reps        Home Exercises and Education Provided     Education provided: None today.    - Progress towards goals :Good    Assessment     Sarthak presents to OT with a dx of R thumb sprain. He reports no pain today. Pt's AROM and pinch strength are improving. Advanced strengthening activities with no increase in pain complaints.  Pt would continue to benefit from skilled OT to enhance functional use of R hand.    Sarthak is progressing well towards his goals and there are no updates to goals at this time. Pt prognosis is excellent.     Pt will continue to benefit from skilled outpatient occupational therapy to address the deficits listed in the problem list on initial evaluation provide pt/family education and to maximize pt's level of independence in the home and community environment.     Anticipated barriers to occupational therapy: none    Pt's spiritual, cultural and educational needs considered and pt agreeable to plan of care and goals.    Goals:  Short Term (4 weeks on 7/29/2018):  1)   Patient to be IND with HEP and modalities for  pain management - MET  2)   Increase ROM by 10-15 degrees in right thumb  to increase functional hand use for writing - MET  3)   Increase  strength 5-10 lbs. to grasp objects - MET  4)   Increase pinch 1-3 psis for opening containers and brushing teeth- NOT MET, continuing  5)   Pt will be able to write without difficutly - NOT MET, continuing     Long Term (by discharge):8/29/2018  1)   Pt will report 0 out of 10 pain with right hand use for writing and brushing teeth.- MET  2)   Patient to score at 28% or less on FOTO to demonstrate improved perception of functional right hand use.  3)   Pt will return to prior level of function for ADLs and household management.     Plan: Advance as tolerated.   Continue with OT.   Certification period: 6/2092018 to 8/29/2018    Discussed Plan of Care with patient: Yes  Updates/Grading for next session: Advance as tolerated.    BRIAN Brunson, CHT

## 2018-09-10 ENCOUNTER — CLINICAL SUPPORT (OUTPATIENT)
Dept: REHABILITATION | Facility: HOSPITAL | Age: 38
End: 2018-09-10
Payer: COMMERCIAL

## 2018-09-10 DIAGNOSIS — M25.541 PAIN IN THUMB JOINT WITH MOVEMENT OF RIGHT HAND: ICD-10-CM

## 2018-09-10 PROCEDURE — 97110 THERAPEUTIC EXERCISES: CPT

## 2018-09-10 PROCEDURE — 97022 WHIRLPOOL THERAPY: CPT

## 2018-09-10 NOTE — PROGRESS NOTES
"                            Occupational Therapy Re-Evaluation Note     Name: Sarthak Caballero  Clinic Number: [id number]    Therapy Diagnosis:   Encounter Diagnosis   Name Primary?    Pain in thumb joint with movement of right hand      Physician: Graciela Valle MD    Physician Orders:  Eval and Treat   Medical Diagnosis: S63.681S (ICD-10-CM) - Other sprain of right thumb, sequela  Surgical Procedure and Date: N/A  Evaluation Date: 6/29/2018  Insurance Authorization Period Expiration: 6/15/2019  Plan of Care Certification Period: 6/29/j2018 to 8/29/2018  Date of Return to MD:   9/12/2018    Visit # / Visits authorized: 11/ 12  Time In: 10:00  AM  Time Out:10:45 AM  Total Billable Time: 45 minutes  Charges: 2 TE, 1 fluido    Precautions: Standard    Subjective     Pt reports: "I still have a little difficulty buttoning a shirt and opening containers."  Response to previous treatment:: Pt reports compliance with orthosis wear schedule.  Functional change: Pt reports that he is able to open some containers if they are not too tight using his right hand.    Pain: 0/10  Location: right thumb      Objective     Observation/Appearance:  Skin intact and no bruising noted.     Edema. None noted     Hand ROM. Measured in degrees.    6/29/2018 6/29/2018 7/11/2018 7/16/2018 8/2/2018 8/20/2018 9/10/2018     Left Right Right Right Right Right Right                Thumb: MP 0/40 0/40 0/45(+5) 0/41(-4) 0/48 0/48 0/50                  IP 0+/98 20/50 20/55(+5) 18/53 (+2/-2) 16/63 14/70 14/70       Rad ADD/ABD 0/65 0/65 0/65(=) 56 (-9) 60 60 60       Pal ADD/ABD 0/80 0/70 0/70 (=) 66 (-4) 75 75 75          Opposition Base of 5th 1.5 cm 1.0 To SF MCP crease (1.5 cm from DPC) .5 cm Base of 5th Base of 5th      Sensation:  Occasional numbness on volar surface of right thumb IP      Strength (Dyanmometer) and Pinch Strength (Pinch Gauge)  Measured in pounds and psi. Average of three trials.    6/29/2018 6/29/2018 7/16/2018 8/2/2018 " 8/20/2018 9/10/2018     Left Right Right Right Right Right   Rung  110 105 (-5) 110(+5) 110(=) 110(=)   Key Pinch 22 18 19 (+1) 19(=) 20(+1) 20(=)   3pt Pinch 16 10 9 (-1) 10(+1) 11(+1) 11(=)   2pt Pinch 12 6 9 (+3) 9(=) 9(=) 11(=)      Sarthak received the following supervised modalities after being cleared for contradictions for 15 minutes:   -Patient received fluidotherapy to right hand for 15 minutes to increase blood flow, circulation, desensitization, sensory re-education and for pain management.    Sarthak received therapeutic exercises for 40 minutes including:  -thumb IP blocking, reverse blocking, thumb circles, opposition, pinky slides and RA/PA x 10 reps each.     Pt also performed green putty for grasping 2', flowering 10x (red), 3 logs ea (key and 3 pt)and thumb flex/ext digs x 10 reps (at home)  Thumbcisor x 30 reps 2 RB  3 containers each med pom poms with black CP (key and 3pt)  Green power web for thumb flex and ext 30 reps ea  Digi-extend with 2 yellow bands x 30 reps        Home Exercises and Education Provided     Education provided: None today.    - Progress towards goals :Good    Assessment     Sarthak presents to OT with a dx of R thumb sprain. He reports no pain today. Pt's AROM and pinch strength are improving. Advanced strengthening activities with no increase in pain complaints.  Pt would continue to benefit from skilled OT to enhance functional use of R hand.    Sarthak is progressing well towards his goals and there are no updates to goals at this time. Pt prognosis is excellent.     Pt will continue to benefit from skilled outpatient occupational therapy to address the deficits listed in the problem list on initial evaluation provide pt/family education and to maximize pt's level of independence in the home and community environment.     Anticipated barriers to occupational therapy: none    Pt's spiritual, cultural and educational needs considered and pt agreeable to plan of care and  goals.    Goals:  Short Term (4 weeks on 7/29/2018):  1)   Patient to be IND with HEP and modalities for pain management - MET  2)   Increase ROM by 10-15 degrees in right thumb  to increase functional hand use for writing - MET  3)   Increase  strength 5-10 lbs. to grasp objects - MET  4)   Increase pinch 1-3 psis for opening containers and brushing teeth- NOT MET, continuing  5)   Pt will be able to write without difficutly - NOT MET, continuing     Long Term (by discharge):  1)   Pt will report 0 out of 10 pain with right hand use for writing and brushing teeth.- MET  2)   Patient to score at 28% or less on FOTO to demonstrate improved perception of functional right hand use.  3)   Pt will return to prior level of function for ADLs and household management.     Plan: Advance as tolerated.   Continue with OT.   Certification period: 8/29/2018 to 10/29/2018    Discussed Plan of Care with patient: Yes  Updates/Grading for next session: Advance as tolerated.    BRIAN Brunson, CHT

## 2018-09-10 NOTE — PLAN OF CARE
"                          Occupational Therapy Re-Evaluation Note     Name: Sarthak Caballero  Clinic Number: [id number]    Therapy Diagnosis:   Encounter Diagnosis   Name Primary?    Pain in thumb joint with movement of right hand      Physician: Graciela Valle MD    Physician Orders:  Eval and Treat   Medical Diagnosis: S63.681S (ICD-10-CM) - Other sprain of right thumb, sequela  Surgical Procedure and Date: N/A  Evaluation Date: 6/29/2018  Insurance Authorization Period Expiration: 6/15/2019  Plan of Care Certification Period: 6/29/j2018 to 8/29/2018  Date of Return to MD:   9/12/2018    Visit # / Visits authorized: 11/ 12  Time In: 10:00  AM  Time Out:10:45 AM  Total Billable Time: 45 minutes  Charges: 2 TE, 1 fluido    Precautions: Standard    Subjective     Pt reports: "I still have a little difficulty buttoning a shirt and opening containers."  Response to previous treatment:: Pt reports compliance with orthosis wear schedule.  Functional change: Pt reports that he is able to open some containers if they are not too tight using his right hand.    Pain: 0/10  Location: right thumb      Objective     Observation/Appearance:  Skin intact and no bruising noted.     Edema. None noted     Hand ROM. Measured in degrees.    6/29/2018 6/29/2018 7/11/2018 7/16/2018 8/2/2018 8/20/2018 9/10/2018     Left Right Right Right Right Right Right                Thumb: MP 0/40 0/40 0/45(+5) 0/41(-4) 0/48 0/48 0/50                  IP 0+/98 20/50 20/55(+5) 18/53 (+2/-2) 16/63 14/70 14/70       Rad ADD/ABD 0/65 0/65 0/65(=) 56 (-9) 60 60 60       Pal ADD/ABD 0/80 0/70 0/70 (=) 66 (-4) 75 75 75          Opposition Base of 5th 1.5 cm 1.0 To SF MCP crease (1.5 cm from DPC) .5 cm Base of 5th Base of 5th      Sensation:  Occasional numbness on volar surface of right thumb IP      Strength (Dyanmometer) and Pinch Strength (Pinch Gauge)  Measured in pounds and psi. Average of three trials.    6/29/2018 6/29/2018 7/16/2018 8/2/2018 8/20/2018 " 9/10/2018     Left Right Right Right Right Right   Rung  110 105 (-5) 110(+5) 110(=) 110(=)   Key Pinch 22 18 19 (+1) 19(=) 20(+1) 20(=)   3pt Pinch 16 10 9 (-1) 10(+1) 11(+1) 11(=)   2pt Pinch 12 6 9 (+3) 9(=) 9(=) 11(=)      Sarthak received the following supervised modalities after being cleared for contradictions for 15 minutes:   -Patient received fluidotherapy to right hand for 15 minutes to increase blood flow, circulation, desensitization, sensory re-education and for pain management.    Alpha received therapeutic exercises for 40 minutes including:  -thumb IP blocking, reverse blocking, thumb circles, opposition, pinky slides and RA/PA x 10 reps each.     Pt also performed green putty for grasping 2', flowering 10x (red), 3 logs ea (key and 3 pt)and thumb flex/ext digs x 10 reps (at home)  Thumbcisor x 30 reps 2 RB  3 containers each med pom poms with black CP (key and 3pt)  Green power web for thumb flex and ext 30 reps ea  Digi-extend with 2 yellow bands x 30 reps        Home Exercises and Education Provided     Education provided: None today.    - Progress towards goals :Good    Assessment     Sarthak presents to OT with a dx of R thumb sprain. He reports no pain today. Pt's AROM and pinch strength are improving. Advanced strengthening activities with no increase in pain complaints.  Pt would continue to benefit from skilled OT to enhance functional use of R hand.    Sarthak is progressing well towards his goals and there are no updates to goals at this time. Pt prognosis is excellent.     Pt will continue to benefit from skilled outpatient occupational therapy to address the deficits listed in the problem list on initial evaluation provide pt/family education and to maximize pt's level of independence in the home and community environment.     Anticipated barriers to occupational therapy: none    Pt's spiritual, cultural and educational needs considered and pt agreeable to plan of care and  goals.    Goals:  Short Term (4 weeks on 7/29/2018):  1)   Patient to be IND with HEP and modalities for pain management - MET  2)   Increase ROM by 10-15 degrees in right thumb  to increase functional hand use for writing - MET  3)   Increase  strength 5-10 lbs. to grasp objects - MET  4)   Increase pinch 1-3 psis for opening containers and brushing teeth- NOT MET, continuing  5)   Pt will be able to write without difficutly - NOT MET, continuing     Long Term (by discharge):  1)   Pt will report 0 out of 10 pain with right hand use for writing and brushing teeth.- MET  2)   Patient to score at 28% or less on FOTO to demonstrate improved perception of functional right hand use.  3)   Pt will return to prior level of function for ADLs and household management.     Plan: Advance as tolerated.   Continue with OT.   Certification period: 8/29/2018 to 10/29/2018    Discussed Plan of Care with patient: Yes  Updates/Grading for next session: Advance as tolerated.    BRIAN Brunson, CHT

## 2018-09-12 ENCOUNTER — OFFICE VISIT (OUTPATIENT)
Dept: SPORTS MEDICINE | Facility: CLINIC | Age: 38
End: 2018-09-12
Payer: COMMERCIAL

## 2018-09-12 VITALS
DIASTOLIC BLOOD PRESSURE: 72 MMHG | HEIGHT: 73 IN | HEART RATE: 64 BPM | WEIGHT: 210 LBS | BODY MASS INDEX: 27.83 KG/M2 | SYSTOLIC BLOOD PRESSURE: 107 MMHG

## 2018-09-12 DIAGNOSIS — S63.681S OTHER SPRAIN OF RIGHT THUMB, SEQUELA: Primary | ICD-10-CM

## 2018-09-12 PROCEDURE — 99999 PR PBB SHADOW E&M-EST. PATIENT-LVL III: CPT | Mod: PBBFAC,,, | Performed by: ORTHOPAEDIC SURGERY

## 2018-09-12 PROCEDURE — 99214 OFFICE O/P EST MOD 30 MIN: CPT | Mod: S$GLB,,, | Performed by: ORTHOPAEDIC SURGERY

## 2018-09-12 NOTE — PROGRESS NOTES
"CHIEF COMPLAINT: Right Hand pain.                                             HISTORY OF PRESENT ILLNESS:  The patient is a 37 y.o.  right hand dominant male who presents for evaluation of his right hand.     History of Trauma: 4/17/18, The pain started while running drills. He reports jamming his thumb into another players knee.  Occupation: [employer]   Pain Duration: 1 months  Pain Quality: achy  Pain Context:unchanged  Pain Timing: intermittent  Pain Location: DIP thumb   Aggrevating Factors: aching intermittently, pain with writing, twisting a cap, and also tender if something jams into his thumb  Previous Treatments: thumb splint/brace without relief, ice   Associated Signs and Symptoms: he notes swelling and in some spots it "feels numb"      Pain is affecting ADLs.     Improved right thumb pain and swellign        PAST MEDICAL HISTORY:        Past Medical History:   Diagnosis Date    Allergy      Asthma      Environmental allergies        PAST SURGICAL HISTORY:         Past Surgical History:   Procedure Laterality Date    SINUS SURGERY   10/2015     FESS x2      FAMILY HISTORY:         Family History   Problem Relation Age of Onset    Hypertension Mother      Heart disease Father      Cancer Maternal Aunt           Breast    Heart disease Maternal Uncle      Stroke Maternal Grandmother        SOCIAL HISTORY:   Social History   Social History            Social History    Marital status: Single       Spouse name: N/A    Number of children: N/A    Years of education: N/A          Occupational History    Not on file.            Social History Main Topics    Smoking status: Never Smoker    Smokeless tobacco: Never Used    Alcohol use No    Drug use: No    Sexual activity: Not Currently       Partners: Female           Other Topics Concern    Not on file          Social History Narrative    No narrative on file            MEDICATIONS:   Current Outpatient Prescriptions:     " "albuterol 90 mcg/actuation inhaler, Inhale 2 puffs into the lungs every 4 (four) hours as needed for Wheezing., Disp: 1 Inhaler, Rfl: 3    fluticasone-salmeterol 500-50 mcg/dose (ADVAIR DISKUS) 500-50 mcg/dose DsDv diskus inhaler, Inhale 1 puff into the lungs 2 (two) times daily., Disp: 1 Inhaler, Rfl: 5    grass-orch-s.kamini-rye-Kblu-maru (ORALAIR) 300 indx reactivity Subl, Place 1 tablet under the tongue Daily., Disp: 30 tablet, Rfl: 11    grass-orch-s.kamini-rye-Kblu-maru (ORALAIR) 300 indx reactivity Subl, Place 1 tablet under the tongue Daily., Disp: 30 tablet, Rfl: 11    mometasone (NASONEX) 50 mcg/actuation nasal spray, 3 sprays by Nasal route 2 (two) times daily., Disp: 3 each, Rfl: 5    predniSONE (DELTASONE) 10 MG tablet, Take 4 daily x7 days, then 3 daily x2 days, then 2 daily x2 days, then 1 daily x1 day, Disp: 39 tablet, Rfl: 0    predniSONE (DELTASONE) 10 MG tablet, Day 1 60 mg Day 2 60 mg Day 3 60 mg Day 4 50 mg Day 5 40 mg Day 6 30 mg Day 7 20 mg Day 8 10 mg Day 9 10 mg Day 10 10 mg, Disp: 35 tablet, Rfl: 0    predniSONE (DELTASONE) 20 MG tablet, 3 tablets a day for 5 days, then 2 tablets until directed to stop for 5 days, then 1 tablet until directed to stop., Disp: 18 tablet, Rfl: 0    budesonide 1 mg/2 mL NbSp, Inhale 1 ampule into the lungs 2 (two) times daily., Disp: 180 mL, Rfl: 0    EPINEPHrine (EPIPEN) 0.3 mg/0.3 mL AtIn, Inject 0.3 mLs (0.3 mg total) into the muscle once as needed., Disp: 1 mL, Rfl: 0  ALLERGIES:        Review of patient's allergies indicates:   Allergen Reactions    Grass pollen-bermuda, standard Shortness Of Breath         VITAL SIGNS: /77   Pulse 70   Ht 6' 1" (1.854 m)   Wt 95.3 kg (210 lb)   BMI 27.71 kg/m²       Review of Systems   Constitution: Negative for chills, fever, weakness and weight loss.   HENT: Negative for congestion.   Cardiovascular: Negative for chest pain and dyspnea on exertion.   Respiratory: Negative for cough and shortness of breath. "   Hematologic/Lymphatic: Does not bruise/bleed easily.   Skin: Negative for rash and suspicious lesions.   Musculoskeletal: see HPI  Gastrointestinal: Negative for bowel incontinence, constipation,diarrhea, vomiting.   Genitourinary: Negative for bladder incontinence.   Neurological: Negative for numbness, paresthesias and sensory change.         PHYSICAL EXAMINATION:  General:  The patient is alert and oriented x 3.  Mood is pleasant.  Observation of ears, eyes and nose reveal no gross abnormalities.  No labored breathing observed.  Gait is coordinated. Patient can toe walk and heel walk without difficulty.         RIGHT HAND EXAM       OBSERVATION:    Swelling                       + thumb DIP                Deformity                     none  Discoloration               none                            Atrophy                        none  Scars                           none                            Erythema                    none     TENDERNESS:  Radial:  DRUJ                                       Neg  Radial Styloid                          Neg  Radial Shaft                            Neg  1st dorsal Compartment         Neg  Daniel's Tubercle                      Neg  Basal Joint Thumb                  Neg  ECRL Tendon                         Neg  FCR Tendon                           Neg  Snuff Box                                Neg  Lunate                                     Neg  Ulnar:  ECU Sheath                            Neg  FCU Tendon                           Neg  Pisiform                                   Neg  TFCC                                       Neg  Ulnar Styloid                            Neg  Ulnar Shaft                              Neg     Hand:  Palmar Fascia             Neg  Metacarpal                              Neg  MCP                                        Neg  Phalanx                                   Neg  PIP                                          Neg  DIP                                           Neg  A1- Pulley                                Neg  Flexor Tendons                       Neg  Finger Tip                                Neg        ROM: (AROM)                                    Right                                        Left                                                                 Wrist Flexion                           80°                                           80°     Wrist Extension                       75°                                           75°  Radial Deviation                      30°                                           30°   Ulnar Deviation                       30°                                           30°         Pronation                                 90                                            90  Supination                               90                                            90     STRENGTH:                                       RIGHT                                     LEFT   Wrist Flexion                           5/5                                           5/5         Wrist Extension                       5/5                                           5/5  Hand                                 5/5                                           5/5  Opposition                               5/5                                           5/5     SPECIAL TESTS:  Phalens                                                                       Neg  Durkan Carpal Compression                                      Neg  Tinel (wrist)                                                                 Neg  Finkelstein                                                                   Neg  Piano Arteaga (ulnar translation)                                      Neg  Hutchinson Scaphoid Shift                                               Neg  Nicanor Test                                                                    Normal  Froment Sign                                                               Neg  CMC Grind                                                                  Neg  [place] (Intrinsic Tightness)                                       Neg                                                                                                         EXTREMITY NEURO-VASCULAR EXAM   Sensation grossly intact to light touch all dermatomal regions.   DTR 2+ Biceps, Triceps, BR and Negative Adams sign  Grossly intact motor function of AIN, PIN, Median, Ulnar , Radial nerves  Distal pulses radial and ulnar 2+, brisk cap refill, symmetric.   Compartments of forearm and hand are soft and compressible  NECK:  Painless FROM and spinous processes non-tender. Negative Spurlings sign.       OTHER FINDINGS:  Decreased ROM with thumb flexion at DIP  Good extension strength     XRAYS: 4/30/2018  Hand series right,  were obtained and reviewed  No convincing fracture or dislocation is noted. The osseous structures appear well mineralized and well aligned     ASSESSMENT:   Right thumb DIP sprain, mallet sprain, healed     PLAN:  OT cont  I have discussed the nature of this problem with the patient today. We discussed both surgical and non-surgical options.   RTC PRN

## 2018-09-17 ENCOUNTER — CLINICAL SUPPORT (OUTPATIENT)
Dept: REHABILITATION | Facility: HOSPITAL | Age: 38
End: 2018-09-17
Payer: COMMERCIAL

## 2018-09-17 DIAGNOSIS — M25.541 PAIN IN THUMB JOINT WITH MOVEMENT OF RIGHT HAND: ICD-10-CM

## 2018-09-17 PROCEDURE — 97110 THERAPEUTIC EXERCISES: CPT

## 2018-09-17 PROCEDURE — 97022 WHIRLPOOL THERAPY: CPT

## 2018-09-17 NOTE — PROGRESS NOTES
"                            Occupational Therapy Re-Evaluation Note     Name: Sarthak Caballero  Clinic Number: [id number]    Therapy Diagnosis:   Encounter Diagnosis   Name Primary?    Pain in thumb joint with movement of right hand      Physician: Graciela Valle MD    Physician Orders:  Eval and Treat   Medical Diagnosis: S63.681S (ICD-10-CM) - Other sprain of right thumb, sequela  Surgical Procedure and Date: N/A  Evaluation Date: 6/29/2018  Insurance Authorization Period Expiration: 6/15/2019  Plan of Care Certification Period: 6/29/j2018 to 8/29/2018  Date of Return to MD:   9/12/2018    Visit # / Visits authorized: 11/ 12  Time In: 10:30 AM  Time Out:11:15 AM  Total Billable Time: 45 minutes  Charges: 2 TE, 1 fluido    Precautions: Standard    Subjective     Pt reports: "I still have a little difficulty buttoning a shirt and opening containers."  Response to previous treatment:: Pt reports compliance with orthosis wear schedule.  Functional change: Pt reports that he is able to open some containers if they are not too tight using his right hand.    Pain: 0/10  Location: right thumb      Objective     Observation/Appearance:  Skin intact and no bruising noted.     Edema. None noted     Hand ROM. Measured in degrees.    6/29/2018 6/29/2018 7/11/2018 7/16/2018 8/2/2018 8/20/2018 9/10/2018     Left Right Right Right Right Right Right                Thumb: MP 0/40 0/40 0/45(+5) 0/41(-4) 0/48 0/48 0/50                  IP 0+/98 20/50 20/55(+5) 18/53 (+2/-2) 16/63 14/70 14/70       Rad ADD/ABD 0/65 0/65 0/65(=) 56 (-9) 60 60 60       Pal ADD/ABD 0/80 0/70 0/70 (=) 66 (-4) 75 75 75          Opposition Base of 5th 1.5 cm 1.0 To SF MCP crease (1.5 cm from DPC) .5 cm Base of 5th Base of 5th      Sensation:  Occasional numbness on volar surface of right thumb IP      Strength (Dyanmometer) and Pinch Strength (Pinch Gauge)  Measured in pounds and psi. Average of three trials.    6/29/2018 6/29/2018 7/16/2018 8/2/2018 8/20/2018 " 9/10/2018     Left Right Right Right Right Right   Rung  110 105 (-5) 110(+5) 110(=) 110(=)   Key Pinch 22 18 19 (+1) 19(=) 20(+1) 20(=)   3pt Pinch 16 10 9 (-1) 10(+1) 11(+1) 11(=)   2pt Pinch 12 6 9 (+3) 9(=) 9(=) 11(=)      Sarthak received the following supervised modalities after being cleared for contradictions for 15 minutes:   -Patient received fluidotherapy to right hand for 15 minutes to increase blood flow, circulation, desensitization, sensory re-education and for pain management.    Alpha received therapeutic exercises for 40 minutes including:  -thumb IP blocking, reverse blocking, thumb circles, opposition, pinky slides and RA/PA x 10 reps each.     Pt also performed green putty for grasping 2', flowering 10x (red), 3 logs ea (key and 3 pt)and thumb flex/ext digs x 10 reps (at home)  Thumbcisor x 30 reps 2 RB  3 containers each med pom poms with black CP (key and 3pt)  Blue power web for thumb flex and ext 30 reps ea  Digi-extend with 2 yellow bands x 30 reps        Home Exercises and Education Provided     Education provided: None today.    - Progress towards goals :Good    Assessment     Sarthak presents to OT with a dx of R thumb sprain. He reports no pain today.  Advanced strengthening activities with no increase in pain complaints.  Pt would continue to benefit from skilled OT to enhance functional use of R hand.    Sarthak is progressing well towards his goals and there are no updates to goals at this time. Pt prognosis is excellent.     Pt will continue to benefit from skilled outpatient occupational therapy to address the deficits listed in the problem list on initial evaluation provide pt/family education and to maximize pt's level of independence in the home and community environment.     Anticipated barriers to occupational therapy: none    Pt's spiritual, cultural and educational needs considered and pt agreeable to plan of care and goals.    Goals:  Short Term (4 weeks on 7/29/2018):  1)    Patient to be IND with HEP and modalities for pain management - MET  2)   Increase ROM by 10-15 degrees in right thumb  to increase functional hand use for writing - MET  3)   Increase  strength 5-10 lbs. to grasp objects - MET  4)   Increase pinch 1-3 psis for opening containers and brushing teeth- NOT MET, continuing  5)   Pt will be able to write without difficutly - NOT MET, continuing     Long Term (by discharge):  1)   Pt will report 0 out of 10 pain with right hand use for writing and brushing teeth.- MET  2)   Patient to score at 28% or less on FOTO to demonstrate improved perception of functional right hand use.- progressing  3)   Pt will return to prior level of function for ADLs and household management. - progressing    Plan: Advance as tolerated.   Continue with OT.   Certification period: 8/29/2018 to 10/29/2018    Discussed Plan of Care with patient: Yes  Updates/Grading for next session: Advance as tolerated.    BRIAN Brunson, CHT

## 2018-11-16 ENCOUNTER — TELEPHONE (OUTPATIENT)
Dept: INTERNAL MEDICINE | Facility: CLINIC | Age: 38
End: 2018-11-16

## 2018-11-16 NOTE — TELEPHONE ENCOUNTER
----- Message from Kurtis Bear sent at 11/16/2018  9:49 AM CST -----  Contact: Patient  11/19/18 Annual Physical need lab orders placed and linked    Patient stating has been some time sense last physical, also a patient of Dr Harris, but no soon time slot, request asap slots.    Please call an advise  Thank you

## 2018-11-19 ENCOUNTER — LAB VISIT (OUTPATIENT)
Dept: LAB | Facility: HOSPITAL | Age: 38
End: 2018-11-19
Payer: COMMERCIAL

## 2018-11-19 ENCOUNTER — TELEPHONE (OUTPATIENT)
Dept: INTERNAL MEDICINE | Facility: CLINIC | Age: 38
End: 2018-11-19

## 2018-11-19 DIAGNOSIS — Z00.00 ANNUAL PHYSICAL EXAM: Primary | ICD-10-CM

## 2018-11-19 DIAGNOSIS — Z00.00 ANNUAL PHYSICAL EXAM: ICD-10-CM

## 2018-11-19 LAB
ALBUMIN SERPL BCP-MCNC: 4.1 G/DL
ALP SERPL-CCNC: 107 U/L
ALT SERPL W/O P-5'-P-CCNC: 23 U/L
ANION GAP SERPL CALC-SCNC: 6 MMOL/L
AST SERPL-CCNC: 17 U/L
BASOPHILS # BLD AUTO: 0.04 K/UL
BASOPHILS NFR BLD: 0.8 %
BILIRUB SERPL-MCNC: 1.4 MG/DL
BUN SERPL-MCNC: 13 MG/DL
CALCIUM SERPL-MCNC: 9.2 MG/DL
CHLORIDE SERPL-SCNC: 107 MMOL/L
CHOLEST SERPL-MCNC: 232 MG/DL
CHOLEST/HDLC SERPL: 4.3 {RATIO}
CO2 SERPL-SCNC: 28 MMOL/L
CREAT SERPL-MCNC: 1.2 MG/DL
DIFFERENTIAL METHOD: ABNORMAL
EOSINOPHIL # BLD AUTO: 0.5 K/UL
EOSINOPHIL NFR BLD: 10.2 %
ERYTHROCYTE [DISTWIDTH] IN BLOOD BY AUTOMATED COUNT: 11.5 %
EST. GFR  (AFRICAN AMERICAN): >60 ML/MIN/1.73 M^2
EST. GFR  (NON AFRICAN AMERICAN): >60 ML/MIN/1.73 M^2
ESTIMATED AVG GLUCOSE: 82 MG/DL
GLUCOSE SERPL-MCNC: 104 MG/DL
HAV IGM SERPL QL IA: NEGATIVE
HBA1C MFR BLD HPLC: 4.5 %
HBV CORE IGM SERPL QL IA: NEGATIVE
HBV SURFACE AG SERPL QL IA: NEGATIVE
HCT VFR BLD AUTO: 46.1 %
HCV AB SERPL QL IA: NEGATIVE
HDLC SERPL-MCNC: 54 MG/DL
HDLC SERPL: 23.3 %
HGB BLD-MCNC: 14.8 G/DL
HIV 1+2 AB+HIV1 P24 AG SERPL QL IA: NEGATIVE
LDLC SERPL CALC-MCNC: 167.2 MG/DL
LYMPHOCYTES # BLD AUTO: 1.8 K/UL
LYMPHOCYTES NFR BLD: 33 %
MCH RBC QN AUTO: 28.1 PG
MCHC RBC AUTO-ENTMCNC: 32.1 G/DL
MCV RBC AUTO: 88 FL
MONOCYTES # BLD AUTO: 0.5 K/UL
MONOCYTES NFR BLD: 8.9 %
NEUTROPHILS # BLD AUTO: 2.5 K/UL
NEUTROPHILS NFR BLD: 46.3 %
NONHDLC SERPL-MCNC: 178 MG/DL
PLATELET # BLD AUTO: 214 K/UL
PMV BLD AUTO: 11.2 FL
POTASSIUM SERPL-SCNC: 4.3 MMOL/L
PROT SERPL-MCNC: 6.9 G/DL
RBC # BLD AUTO: 5.26 M/UL
SODIUM SERPL-SCNC: 141 MMOL/L
TRIGL SERPL-MCNC: 54 MG/DL
TSH SERPL DL<=0.005 MIU/L-ACNC: 1.68 UIU/ML
WBC # BLD AUTO: 5.3 K/UL

## 2018-11-19 PROCEDURE — 86592 SYPHILIS TEST NON-TREP QUAL: CPT

## 2018-11-19 PROCEDURE — 83036 HEMOGLOBIN GLYCOSYLATED A1C: CPT

## 2018-11-19 PROCEDURE — 36415 COLL VENOUS BLD VENIPUNCTURE: CPT

## 2018-11-19 PROCEDURE — 85025 COMPLETE CBC W/AUTO DIFF WBC: CPT

## 2018-11-19 PROCEDURE — 84443 ASSAY THYROID STIM HORMONE: CPT

## 2018-11-19 PROCEDURE — 80053 COMPREHEN METABOLIC PANEL: CPT

## 2018-11-19 PROCEDURE — 86703 HIV-1/HIV-2 1 RESULT ANTBDY: CPT

## 2018-11-19 PROCEDURE — 80061 LIPID PANEL: CPT

## 2018-11-19 PROCEDURE — 80074 ACUTE HEPATITIS PANEL: CPT

## 2018-11-19 NOTE — TELEPHONE ENCOUNTER
Patient scheduled for physical with me but didn't realize he had physical scheduled 11/21/18 at 7:30 AM with Dr. Harris.    Requests lab orders today and will keep appointment with Dr. Harris on 11/21/18 to review lab results.    Orders placed.

## 2018-11-20 LAB — RPR SER QL: NORMAL

## 2018-11-21 ENCOUNTER — OFFICE VISIT (OUTPATIENT)
Dept: INTERNAL MEDICINE | Facility: CLINIC | Age: 38
End: 2018-11-21
Payer: COMMERCIAL

## 2018-11-21 VITALS
HEART RATE: 69 BPM | DIASTOLIC BLOOD PRESSURE: 76 MMHG | HEIGHT: 73 IN | BODY MASS INDEX: 30.09 KG/M2 | WEIGHT: 227 LBS | SYSTOLIC BLOOD PRESSURE: 114 MMHG

## 2018-11-21 DIAGNOSIS — E78.5 HYPERLIPIDEMIA, UNSPECIFIED HYPERLIPIDEMIA TYPE: ICD-10-CM

## 2018-11-21 DIAGNOSIS — R53.83 FATIGUE, UNSPECIFIED TYPE: ICD-10-CM

## 2018-11-21 DIAGNOSIS — Z00.00 ANNUAL PHYSICAL EXAM: Primary | ICD-10-CM

## 2018-11-21 DIAGNOSIS — M72.2 BILATERAL PLANTAR FASCIITIS: ICD-10-CM

## 2018-11-21 PROCEDURE — 99395 PREV VISIT EST AGE 18-39: CPT | Mod: S$GLB,,, | Performed by: INTERNAL MEDICINE

## 2018-11-21 PROCEDURE — 99999 PR PBB SHADOW E&M-EST. PATIENT-LVL III: CPT | Mod: PBBFAC,,, | Performed by: INTERNAL MEDICINE

## 2018-11-21 NOTE — PATIENT INSTRUCTIONS
"For plantar fasciitis, I recommend using over-the-counter [employer] "Total Support" arch supports, either the original or Max.   "

## 2018-11-21 NOTE — PROGRESS NOTES
Subjective:       Patient ID: Sarthak Caballero is a 38 y.o. male.    Chief Complaint: Annual Exam    HPI    Last visit with me 2 yrs ago. Since then seen by [facility]. Also seen in the last year by Allergy.    Pain in feet, bilaterally, sharp. Has history of flat feet. Along bottom of foot. Almost every day. Hurst when walking or standing, also when getting out of bed. No history of plantar fasciitis. Not burning.    Tired in general. Tired quickly. thinks not getting enough rest. On average about 5-6 hrs/night, but not good sleep. Toss and turns a lot. No snoring or observed sleep apnea.    Reviewed PMH, PSH, SH, FH, allergies, and medications.     Review of Systems   All other systems reviewed and are negative.      Objective:      Physical Exam   Constitutional: He is oriented to person, place, and time. No distress.   HENT:   Head: Atraumatic.   Right Ear: Tympanic membrane normal. No tenderness.   Left Ear: Tympanic membrane normal. No tenderness.   Mouth/Throat: Oropharynx is clear and moist. No oropharyngeal exudate.   Eyes: Pupils are equal, round, and reactive to light. Right eye exhibits no discharge. Left eye exhibits no discharge.   Neck: Normal range of motion. No thyromegaly present.   Cardiovascular: Normal rate, regular rhythm and normal heart sounds.   Pulses:       Dorsalis pedis pulses are 2+ on the left side.        Posterior tibial pulses are 2+ on the left side.   Pulmonary/Chest: Effort normal and breath sounds normal. No stridor. He has no wheezes. He has no rales.   Musculoskeletal: He exhibits no edema or tenderness.        Left foot: There is normal range of motion and no deformity.   Feet:   Left Foot:   Skin Integrity: Negative for ulcer, blister, skin breakdown, erythema, warmth, callus or dry skin.   Lymphadenopathy:     He has no cervical adenopathy.   Neurological: He is alert and oriented to person, place, and time.   Skin: Skin is warm and dry. No rash noted.   Psychiatric:  "He has a normal mood and affect. His behavior is normal.   Nursing note and vitals reviewed.      Vitals:    11/21/18 0730   BP: 114/76   BP Location: Right arm   Patient Position: Sitting   BP Method: Large (Manual)   Pulse: 69   Weight: 103 kg (227 lb)   Height: 6' 1" (1.854 m)     Body mass index is 29.95 kg/m².    RESULTS: Reviewed labs from last 12 months    Assessment:       1. Annual physical exam    2. Hyperlipidemia, unspecified hyperlipidemia type    3. Bilateral plantar fasciitis    4. Fatigue, unspecified type        Plan:   Sarthak was seen today for annual exam.    Diagnoses and all orders for this visit:    Annual physical exam:  Age-appropriate health screening reviewed, indicated tests ordered. Recommended flu vaccine, patient defers.   -     GLUCOSE, FASTING; Future  -     Lipid panel; Future    Hyperlipidemia, unspecified hyperlipidemia type:  Prior dx, counseled today about decreasing animal protein to help improve cholesterol, recheck lipids in 3-4 mo.  -     GLUCOSE, FASTING; Future  -     Lipid panel; Future    Bilateral plantar fasciitis:  Chronic problem, new dx, start arch supports. Will notify Podiatry to see if the patient qualifies for research study.    Fatigue, unspecified type:  Chronic problem, likely due to insufficient sleep. Counseled regarding sleep hygiene. Increase sleep to 7-8 hrs/night. Please notify the office if the symptoms persist or worsen.     Follow-up in about 1 year (around 11/21/2019) for EPP annual exam, fasting labs 1 week prior.  Johnnie Harris MD  Internal Medicine    Portions of this note were completed using medical dictation software. Please excuse typographical or syntax errors that were missed on review.    "

## 2018-11-21 NOTE — Clinical Note
Angel Hollis,I know you mentioned the study regarding plantar fasciitis. Mr Caballero has had a new dx of this. I told him about your study and let him know your team might be reaching out to him if he qualifies. Thanks!Johnnie

## 2019-02-07 RX ORDER — FLUTICASONE PROPIONATE AND SALMETEROL 500; 50 UG/1; UG/1
1 POWDER RESPIRATORY (INHALATION) 2 TIMES DAILY
Qty: 1 INHALER | Refills: 5 | OUTPATIENT
Start: 2019-02-07

## 2019-03-14 RX ORDER — FLUTICASONE PROPIONATE AND SALMETEROL 500; 50 UG/1; UG/1
1 POWDER RESPIRATORY (INHALATION) 2 TIMES DAILY
Qty: 1 INHALER | Refills: 5 | Status: SHIPPED | OUTPATIENT
Start: 2019-03-14 | End: 2019-12-20

## 2019-11-13 ENCOUNTER — OFFICE VISIT (OUTPATIENT)
Dept: URGENT CARE | Facility: CLINIC | Age: 39
End: 2019-11-13
Payer: COMMERCIAL

## 2019-11-13 VITALS
SYSTOLIC BLOOD PRESSURE: 122 MMHG | WEIGHT: 220 LBS | DIASTOLIC BLOOD PRESSURE: 83 MMHG | TEMPERATURE: 98 F | HEIGHT: 73 IN | HEART RATE: 66 BPM | OXYGEN SATURATION: 98 % | BODY MASS INDEX: 29.16 KG/M2 | RESPIRATION RATE: 16 BRPM

## 2019-11-13 DIAGNOSIS — J00 NASOPHARYNGITIS: ICD-10-CM

## 2019-11-13 DIAGNOSIS — H66.001 NON-RECURRENT ACUTE SUPPURATIVE OTITIS MEDIA OF RIGHT EAR WITHOUT SPONTANEOUS RUPTURE OF TYMPANIC MEMBRANE: Primary | ICD-10-CM

## 2019-11-13 PROCEDURE — 99213 PR OFFICE/OUTPT VISIT, EST, LEVL III, 20-29 MIN: ICD-10-PCS | Mod: S$GLB,,, | Performed by: STUDENT IN AN ORGANIZED HEALTH CARE EDUCATION/TRAINING PROGRAM

## 2019-11-13 PROCEDURE — 3008F BODY MASS INDEX DOCD: CPT | Mod: CPTII,S$GLB,, | Performed by: STUDENT IN AN ORGANIZED HEALTH CARE EDUCATION/TRAINING PROGRAM

## 2019-11-13 PROCEDURE — 3008F PR BODY MASS INDEX (BMI) DOCUMENTED: ICD-10-PCS | Mod: CPTII,S$GLB,, | Performed by: STUDENT IN AN ORGANIZED HEALTH CARE EDUCATION/TRAINING PROGRAM

## 2019-11-13 PROCEDURE — 99213 OFFICE O/P EST LOW 20 MIN: CPT | Mod: S$GLB,,, | Performed by: STUDENT IN AN ORGANIZED HEALTH CARE EDUCATION/TRAINING PROGRAM

## 2019-11-13 RX ORDER — AZELASTINE 1 MG/ML
1 SPRAY, METERED NASAL 2 TIMES DAILY
Qty: 30 ML | Refills: 0 | Status: SHIPPED | OUTPATIENT
Start: 2019-11-13 | End: 2020-03-04

## 2019-11-13 RX ORDER — AMOXICILLIN AND CLAVULANATE POTASSIUM 875; 125 MG/1; MG/1
1 TABLET, FILM COATED ORAL EVERY 12 HOURS
Qty: 14 TABLET | Refills: 0 | Status: SHIPPED | OUTPATIENT
Start: 2019-11-13 | End: 2019-11-18

## 2019-11-13 RX ORDER — IBUPROFEN 600 MG/1
600 TABLET ORAL EVERY 8 HOURS PRN
Qty: 15 TABLET | Refills: 0 | Status: SHIPPED | OUTPATIENT
Start: 2019-11-13 | End: 2019-11-18

## 2019-11-13 NOTE — PATIENT INSTRUCTIONS
Middle Ear Infection (Adult)  You have an infection of the middle ear, the space behind the eardrum. This is also called acute otitis media (AOM). Sometimes it is caused by the common cold. This is because congestion can block the internal passage (eustachian tube) that drains fluid from the middle ear. When the middle ear fills with fluid, bacteria can grow there and cause an infection. Oral antibiotics are used to treat this illness, not ear drops. Symptoms usually start to improve within 1 to 2 days of treatment.    Home care  The following are general care guidelines:  · Finish all of the antibiotic medicine given, even though you may feel better after the first few days.  · You may use over-the-counter medicine, such as acetaminophen or ibuprofen, to control pain and fever, unless something else was prescribed. If you have chronic liver or kidney disease or have ever had a stomach ulcer or gastrointestinal bleeding, talk with your healthcare provider before using these medicines. Do not give aspirin to anyone under 18 years of age who has a fever. It may cause severe illness or death.  Follow-up care  Follow up with your healthcare provider, or as advised, in 2 weeks if all symptoms have not gotten better, or if hearing doesn't go back to normal within 1 month.  When to seek medical advice  Call your healthcare provider right away if any of these occur:  · Ear pain gets worse or does not improve after 3 days of treatment  · Unusual drowsiness or confusion  · Neck pain, stiff neck, or headache  · Fluid or blood draining from the ear canal  · Fever of 100.4°F (38°C) or as advised   · Seizure  Date Last Reviewed: 6/1/2016  © 5587-2643 [employer]. [address] [place] [address], [place] [address]. All rights reserved. This information is not intended as a substitute for professional medical care. Always follow your healthcare professional's instructions.

## 2019-11-13 NOTE — PROGRESS NOTES
"Subjective:       Patient ID: Sarthak Caballero is a 39 y.o. male.    Vitals:  height is 6' 1" (1.854 m) and weight is 99.8 kg (220 lb). His oral temperature is 97.8 °F (36.6 °C). His blood pressure is 122/83 and his pulse is 66. His respiration is 16 and oxygen saturation is 98%.     Chief Complaint: Otalgia (Both ear)    Patient reports bilateral ear pain for 2 days, worse on the R with decreased hearing. Has associated congestion and sore throat with non-productive cough.    Otalgia    There is pain in both ears. This is a new problem. The current episode started in the past 7 days (2 days). The problem occurs constantly. The problem has been gradually worsening. There has been no fever. The pain is at a severity of 7/10. The pain is severe. Associated symptoms include coughing, hearing loss, rhinorrhea and a sore throat. Pertinent negatives include no abdominal pain, diarrhea, ear discharge, headaches, neck pain, rash or vomiting. He has tried nothing for the symptoms. The treatment provided no relief. There is no history of a chronic ear infection.       Constitution: Negative for chills, sweating, fatigue and fever.   HENT: Positive for ear pain, hearing loss, congestion, sinus pain, sinus pressure and sore throat. Negative for ear discharge, tinnitus, trouble swallowing and voice change.         Itchy ears   Neck: Positive for painful lymph nodes. Negative for neck pain and neck stiffness.   Cardiovascular: Negative for chest pain.   Eyes: Negative for eye discharge, eye itching and eye redness.   Respiratory: Positive for cough and asthma. Negative for chest tightness, sputum production, shortness of breath, stridor and wheezing.    Gastrointestinal: Negative for abdominal pain, nausea, vomiting and diarrhea.   Musculoskeletal: Negative for joint pain, joint swelling and muscle ache.   Skin: Negative for color change, rash and lesion.   Allergic/Immunologic: Positive for asthma. Negative for seasonal allergies. " "  Neurological: Negative for dizziness, light-headedness and headaches.   Hematologic/Lymphatic: Positive for swollen lymph nodes.   Psychiatric/Behavioral: Negative for confusion, agitation and sleep disturbance.       Objective:       Vitals:    11/13/19 1641   BP: 122/83   BP Location: Right arm   Patient Position: Sitting   Pulse: 66   Resp: 16   Temp: 97.8 °F (36.6 °C)   TempSrc: Oral   SpO2: 98%   Weight: 99.8 kg (220 lb)   Height: 6' 1" (1.854 m)     Physical Exam   Constitutional: He is oriented to person, place, and time. He appears well-developed and well-nourished. No distress.   HENT:   Head: Normocephalic and atraumatic.   Right Ear: Hearing, external ear and ear canal normal. There is tenderness. No mastoid tenderness. Tympanic membrane is erythematous and bulging. Tympanic membrane is not perforated. A middle ear effusion is present.   Left Ear: Hearing, external ear and ear canal normal. No tenderness. No mastoid tenderness. Tympanic membrane is erythematous and bulging. Tympanic membrane is not perforated.  No middle ear effusion.   Nose: Mucosal edema and rhinorrhea present. No purulent discharge. Right sinus exhibits no maxillary sinus tenderness and no frontal sinus tenderness. Left sinus exhibits no maxillary sinus tenderness and no frontal sinus tenderness.   Mouth/Throat: Uvula is midline and mucous membranes are normal. Posterior oropharyngeal erythema present. No oropharyngeal exudate, posterior oropharyngeal edema or tonsillar abscesses. Tonsils are 1+ on the right. Tonsils are 1+ on the left. No tonsillar exudate.   Eyes: Conjunctivae and EOM are normal. Right eye exhibits no discharge. Left eye exhibits no discharge.   Neck: Normal range of motion. Neck supple.   Cardiovascular: Normal rate, regular rhythm and normal heart sounds.   No murmur heard.  Pulmonary/Chest: Effort normal and breath sounds normal. No stridor. He has no wheezes. He has no rales.   Abdominal: Soft. Bowel sounds are " normal. He exhibits no distension. There is no tenderness.   Musculoskeletal: Normal range of motion. He exhibits no edema or tenderness.   Lymphadenopathy:        Head (right side): Posterior auricular adenopathy present. No preauricular adenopathy present.        Head (left side): No preauricular and no posterior auricular adenopathy present.     He has no cervical adenopathy.   Neurological: He is alert and oriented to person, place, and time. No cranial nerve deficit (CN II-XII grossly intact).   Skin: Skin is warm, dry and no rash.   Psychiatric: He has a normal mood and affect. His behavior is normal. Judgment and thought content normal.   Nursing note and vitals reviewed.        Assessment:       1. Non-recurrent acute suppurative otitis media of right ear without spontaneous rupture of tympanic membrane    2. Nasopharyngitis        Plan:         Non-recurrent acute suppurative otitis media of right ear without spontaneous rupture of tympanic membrane  -     amoxicillin-clavulanate 875-125mg (AUGMENTIN) 875-125 mg per tablet; Take 1 tablet by mouth every 12 (twelve) hours. for 7 days  Dispense: 14 tablet; Refill: 0  -     ibuprofen (ADVIL,MOTRIN) 600 MG tablet; Take 1 tablet (600 mg total) by mouth every 8 (eight) hours as needed.  Dispense: 15 tablet; Refill: 0    Nasopharyngitis  -     azelastine (ASTELIN) 137 mcg (0.1 %) nasal spray; 1 spray (137 mcg total) by Nasal route 2 (two) times daily. for 14 days  Dispense: 30 mL; Refill: 0  -     ibuprofen (ADVIL,MOTRIN) 600 MG tablet; Take 1 tablet (600 mg total) by mouth every 8 (eight) hours as needed.  Dispense: 15 tablet; Refill: 0    Start OTC antihistamine    Medications and diagnosis reviewed with patient, questions answered, and return precautions given.    Follow up if symptoms worsen or fail to improve.    Vincent Simeon MD/MPH  [facility]

## 2019-11-18 ENCOUNTER — OFFICE VISIT (OUTPATIENT)
Dept: INTERNAL MEDICINE | Facility: CLINIC | Age: 39
End: 2019-11-18
Payer: COMMERCIAL

## 2019-11-18 DIAGNOSIS — H66.011 NON-RECURRENT ACUTE SUPPURATIVE OTITIS MEDIA OF RIGHT EAR WITH SPONTANEOUS RUPTURE OF TYMPANIC MEMBRANE: ICD-10-CM

## 2019-11-18 PROCEDURE — 99213 OFFICE O/P EST LOW 20 MIN: CPT | Mod: S$GLB,,, | Performed by: PHYSICIAN ASSISTANT

## 2019-11-18 PROCEDURE — 99999 PR PBB SHADOW E&M-EST. PATIENT-LVL III: ICD-10-PCS | Mod: PBBFAC,,, | Performed by: PHYSICIAN ASSISTANT

## 2019-11-18 PROCEDURE — 99999 PR PBB SHADOW E&M-EST. PATIENT-LVL III: CPT | Mod: PBBFAC,,, | Performed by: PHYSICIAN ASSISTANT

## 2019-11-18 PROCEDURE — 99213 PR OFFICE/OUTPT VISIT, EST, LEVL III, 20-29 MIN: ICD-10-PCS | Mod: S$GLB,,, | Performed by: PHYSICIAN ASSISTANT

## 2019-11-18 RX ORDER — PREDNISONE 10 MG/1
TABLET ORAL
Qty: 9 TABLET | Refills: 0 | Status: SHIPPED | OUTPATIENT
Start: 2019-11-18 | End: 2020-01-21

## 2019-11-18 RX ORDER — AMOXICILLIN AND CLAVULANATE POTASSIUM 875; 125 MG/1; MG/1
1 TABLET, FILM COATED ORAL EVERY 12 HOURS
Qty: 6 TABLET | Refills: 0 | Status: SHIPPED | OUTPATIENT
Start: 2019-11-18 | End: 2019-11-21

## 2019-11-18 NOTE — PROGRESS NOTES
Subjective:       Patient ID: Sarthak Caballero is a 39 y.o. male.    Chief Complaint: Otalgia    Patient presents for one week of persistent bilateral ear pain. He was seen five days ago at urgent care for congestion, cough, sore throat and given Augmentin. He is on day 5 of 7 of antibiotic. He states the sore throat and cough have resolved but ear pain and fullness persists. He states the right ear is draining externally but both ears feel clogged with decreased hearing. He states on day one of the antibiotic he felt a painful pop followed by a yellow mucus coming out of the ear.  He says this occurred on and off during the day but has not occurred again.   He is also taking nasal spray given in urgent care. He says he has a significant sinus polyp history and has had 2 sinus surgeries.  He is not currently being followed by an ENT    Review of Systems   Constitutional: Negative for activity change, appetite change and fever.   HENT: Positive for congestion and ear pain. Negative for postnasal drip, rhinorrhea, sinus pressure, sinus pain and sore throat.    Eyes: Negative for pain and visual disturbance.   Respiratory: Negative for cough, chest tightness, shortness of breath and wheezing.    Cardiovascular: Negative for chest pain and palpitations.   Gastrointestinal: Negative for abdominal distention, abdominal pain, constipation, diarrhea, nausea and vomiting.   Genitourinary: Negative for difficulty urinating, dysuria and flank pain.   Musculoskeletal: Negative for arthralgias, back pain and myalgias.   Skin: Negative for color change and rash.   Neurological: Negative for dizziness, weakness, light-headedness, numbness and headaches.       Objective:      Physical Exam   Constitutional: He is oriented to person, place, and time. He appears well-developed and well-nourished. No distress.   HENT:   Head: Normocephalic and atraumatic.   Right Ear: External ear normal. Tympanic membrane is perforated (possible small  perforation, no active draining).   Left Ear: External ear normal.   Ears:    Nose: Nose normal.   Mouth/Throat: Oropharynx is clear and moist.   Fluid and exudate behind left TM. Right TM hard to visualize due to cerumen build up.    Eyes: Pupils are equal, round, and reactive to light. Conjunctivae and EOM are normal.   Neck: Normal range of motion. Neck supple.   Cardiovascular: Normal rate, regular rhythm and normal heart sounds.   No murmur heard.  Pulmonary/Chest: Effort normal and breath sounds normal. No respiratory distress.   Abdominal: Soft. Bowel sounds are normal. He exhibits no distension.   Musculoskeletal: He exhibits no edema or deformity.   Neurological: He is alert and oriented to person, place, and time.   Skin: Skin is warm and dry.       Assessment:       1. Non-recurrent acute suppurative otitis media of right ear with spontaneous rupture of tympanic membrane        Plan:       Sarthak was seen today for otalgia.    Diagnoses and all orders for this visit:    Non-recurrent acute suppurative otitis media of right ear with spontaneous rupture of tympanic membrane  -     amoxicillin-clavulanate 875-125mg (AUGMENTIN) 875-125 mg per tablet; Take 1 tablet by mouth every 12 (twelve) hours. for 3 days  -     Ambulatory consult to ENT  -     predniSONE (DELTASONE) 10 MG tablet; Take 2 pills a day for 3 days then 1 pill a day for 3 days    Extending Augmentin to 10 days.   Referral to ENT.

## 2019-11-22 ENCOUNTER — PATIENT OUTREACH (OUTPATIENT)
Dept: ADMINISTRATIVE | Facility: OTHER | Age: 39
End: 2019-11-22

## 2019-11-22 ENCOUNTER — CLINICAL SUPPORT (OUTPATIENT)
Dept: OTOLARYNGOLOGY | Facility: CLINIC | Age: 39
End: 2019-11-22
Payer: COMMERCIAL

## 2019-11-22 ENCOUNTER — OFFICE VISIT (OUTPATIENT)
Dept: OTOLARYNGOLOGY | Facility: CLINIC | Age: 39
End: 2019-11-22
Payer: COMMERCIAL

## 2019-11-22 VITALS
SYSTOLIC BLOOD PRESSURE: 112 MMHG | BODY MASS INDEX: 29.4 KG/M2 | HEIGHT: 73 IN | WEIGHT: 221.81 LBS | TEMPERATURE: 98 F | HEART RATE: 67 BPM | DIASTOLIC BLOOD PRESSURE: 78 MMHG

## 2019-11-22 DIAGNOSIS — H90.0 CONDUCTIVE HEARING LOSS OF BOTH EARS: ICD-10-CM

## 2019-11-22 DIAGNOSIS — H90.0 CONDUCTIVE HEARING LOSS OF BOTH EARS: Primary | ICD-10-CM

## 2019-11-22 DIAGNOSIS — H60.91 OTITIS EXTERNA OF RIGHT EAR, UNSPECIFIED CHRONICITY, UNSPECIFIED TYPE: Primary | ICD-10-CM

## 2019-11-22 DIAGNOSIS — H66.002 ACUTE SUPPURATIVE OTITIS MEDIA OF LEFT EAR WITHOUT SPONTANEOUS RUPTURE OF TYMPANIC MEMBRANE, RECURRENCE NOT SPECIFIED: ICD-10-CM

## 2019-11-22 PROCEDURE — 99244 OFF/OP CNSLTJ NEW/EST MOD 40: CPT | Mod: 25,S$GLB,, | Performed by: OTOLARYNGOLOGY

## 2019-11-22 PROCEDURE — 92567 PR TYMPA2METRY: ICD-10-PCS | Mod: S$GLB,,, | Performed by: AUDIOLOGIST-HEARING AID FITTER

## 2019-11-22 PROCEDURE — 92504 PR EAR MICROSCOPY EXAMINATION: ICD-10-PCS | Mod: S$GLB,,, | Performed by: OTOLARYNGOLOGY

## 2019-11-22 PROCEDURE — 92557 PR COMPREHENSIVE HEARING TEST: ICD-10-PCS | Mod: S$GLB,,, | Performed by: AUDIOLOGIST-HEARING AID FITTER

## 2019-11-22 PROCEDURE — 99999 PR PBB SHADOW E&M-EST. PATIENT-LVL I: CPT | Mod: PBBFAC,,, | Performed by: AUDIOLOGIST-HEARING AID FITTER

## 2019-11-22 PROCEDURE — 99244 PR OFFICE CONSULTATION,LEVEL IV: ICD-10-PCS | Mod: 25,S$GLB,, | Performed by: OTOLARYNGOLOGY

## 2019-11-22 PROCEDURE — 99999 PR PBB SHADOW E&M-EST. PATIENT-LVL III: CPT | Mod: PBBFAC,,, | Performed by: OTOLARYNGOLOGY

## 2019-11-22 PROCEDURE — 92504 EAR MICROSCOPY EXAMINATION: CPT | Mod: S$GLB,,, | Performed by: OTOLARYNGOLOGY

## 2019-11-22 PROCEDURE — 99999 PR PBB SHADOW E&M-EST. PATIENT-LVL III: ICD-10-PCS | Mod: PBBFAC,,, | Performed by: OTOLARYNGOLOGY

## 2019-11-22 PROCEDURE — 92567 TYMPANOMETRY: CPT | Mod: S$GLB,,, | Performed by: AUDIOLOGIST-HEARING AID FITTER

## 2019-11-22 PROCEDURE — 96372 THER/PROPH/DIAG INJ SC/IM: CPT | Mod: 59,S$GLB,, | Performed by: OTOLARYNGOLOGY

## 2019-11-22 PROCEDURE — 99999 PR PBB SHADOW E&M-EST. PATIENT-LVL I: ICD-10-PCS | Mod: PBBFAC,,, | Performed by: AUDIOLOGIST-HEARING AID FITTER

## 2019-11-22 PROCEDURE — 96372 PR INJECTION,THERAP/PROPH/DIAG2ST, IM OR SUBCUT: ICD-10-PCS | Mod: 59,S$GLB,, | Performed by: OTOLARYNGOLOGY

## 2019-11-22 PROCEDURE — 92557 COMPREHENSIVE HEARING TEST: CPT | Mod: S$GLB,,, | Performed by: AUDIOLOGIST-HEARING AID FITTER

## 2019-11-22 RX ORDER — METHYLPREDNISOLONE ACETATE 40 MG/ML
40 INJECTION, SUSPENSION INTRA-ARTICULAR; INTRALESIONAL; INTRAMUSCULAR; SOFT TISSUE
Status: COMPLETED | OUTPATIENT
Start: 2019-11-22 | End: 2019-11-22

## 2019-11-22 RX ORDER — OFLOXACIN 3 MG/ML
SOLUTION/ DROPS OPHTHALMIC
Qty: 5 ML | Refills: 1 | Status: SHIPPED | OUTPATIENT
Start: 2019-11-22

## 2019-11-22 RX ORDER — PREDNISOLONE ACETATE 10 MG/ML
SUSPENSION/ DROPS OPHTHALMIC
Qty: 5 ML | Refills: 1 | Status: SHIPPED | OUTPATIENT
Start: 2019-11-22 | End: 2021-05-12

## 2019-11-22 RX ORDER — AMOXICILLIN AND CLAVULANATE POTASSIUM 875; 125 MG/1; MG/1
1 TABLET, FILM COATED ORAL 2 TIMES DAILY
Qty: 8 TABLET | Refills: 0 | Status: SHIPPED | OUTPATIENT
Start: 2019-11-22 | End: 2019-11-26

## 2019-11-22 RX ADMIN — METHYLPREDNISOLONE ACETATE 40 MG: 40 INJECTION, SUSPENSION INTRA-ARTICULAR; INTRALESIONAL; INTRAMUSCULAR; SOFT TISSUE at 02:11

## 2019-11-22 NOTE — PATIENT INSTRUCTIONS
Use drops in right ear twice daily (both floxin and predforte). If left ear were to start draining then use drops on that side too.    Dry ear precautions - Keep water out of ears. Use a vaseline coated cotton ball in ear while showering and hair dryer on low afterwards

## 2019-11-22 NOTE — PROGRESS NOTES
Otolaryngology Clinic Note    Alpha English  Encounter Date: 11/22/2019   YOB: 1980  Referring Physician: [place], [place]  [address] [id number]   PCP: Johnnie Harris MD    Chief Complaint:   Chief Complaint   Patient presents with    Ear Fullness    Ear Drainage     right. Had ear infection, and keeps draining.        HPI: Sarthak Caballero is a 39 y.o. male here for decreased hearing and right ear drainage. Has had decreased hearing for years. Drainage has been for a week. No drops. On augmentin and prednisone. No prior ear surgeries. No prior infections. Of note did have FESS end of 2015. Still has congestion. Denies facial pressure or pain. No nasal sprays.     Review of Systems   Constitutional: Negative for chills, fever and weight loss.   HENT: Positive for congestion, ear discharge, ear pain and hearing loss. Negative for sinus pain and tinnitus.    Eyes: Negative for blurred vision and double vision.   Respiratory: Negative for cough and shortness of breath.    Cardiovascular: Negative for chest pain and palpitations.   Gastrointestinal: Negative for nausea and vomiting.   Musculoskeletal: Negative for joint pain and neck pain.   Skin: Negative for rash.   Neurological: Negative for dizziness, focal weakness and headaches.   Psychiatric/Behavioral: Negative for depression. The patient is not nervous/anxious.         Review of patient's allergies indicates:   Allergen Reactions    Grass pollen-bermuda, standard Shortness Of Breath       Past Medical History:   Diagnosis Date    Allergy     Asthma     Environmental allergies        Past Surgical History:   Procedure Laterality Date    SINUS SURGERY  10/2015    FESS x2       Social History     Socioeconomic History    Marital status: Single     Spouse name: Not on file    Number of children: Not on file    Years of education: Not on file    Highest education level: Not on file   Occupational History    Not on file    Social Needs    Financial resource strain: Not on file    Food insecurity:     Worry: Not on file     Inability: Not on file    Transportation needs:     Medical: Not on file     Non-medical: Not on file   Tobacco Use    Smoking status: Never Smoker    Smokeless tobacco: Never Used   Substance and Sexual Activity    Alcohol use: No    Drug use: No    Sexual activity: Not Currently     Partners: Female   Lifestyle    Physical activity:     Days per week: Not on file     Minutes per session: Not on file    Stress: Not on file   Relationships    Social connections:     Talks on phone: Not on file     Gets together: Not on file     Attends [employer] service: Not on file     Active member of club or organization: Not on file     Attends meetings of clubs or organizations: Not on file     Relationship status: Not on file   Other Topics Concern    Not on file   Social History Narrative    Not on file       Family History   Problem Relation Age of Onset    Hypertension Mother     Heart disease Father     Cancer Maternal Aunt         Breast    Heart disease Maternal Uncle     Stroke Maternal Grandmother        Outpatient Encounter Medications as of 2019   Medication Sig Dispense Refill    albuterol 90 mcg/actuation inhaler Inhale 2 puffs into the lungs every 4 (four) hours as needed for Wheezing. 1 Inhaler 3    fluticasone-salmeterol diskus inhaler 500-50 mcg Inhale 1 puff into the lungs 2 (two) times daily. 1 Inhaler 5    predniSONE (DELTASONE) 10 MG tablet Take 2 pills a day for 3 days then 1 pill a day for 3 days 9 tablet 0    [] amoxicillin-clavulanate 875-125mg (AUGMENTIN) 875-125 mg per tablet Take 1 tablet by mouth every 12 (twelve) hours. for 3 days 6 tablet 0    amoxicillin-clavulanate 875-125mg (AUGMENTIN) 875-125 mg per tablet Take 1 tablet by mouth 2 (two) times daily. for 4 days 8 tablet 0    azelastine (ASTELIN) 137 mcg (0.1 %) nasal spray 1 spray (137 mcg total) by Nasal  route 2 (two) times daily. for 14 days (Patient not taking: Reported on 2019) 30 mL 0    budesonide 1 mg/2 mL NbSp Inhale 1 ampule into the lungs 2 (two) times daily. 180 mL 0    EPINEPHrine (EPIPEN) 0.3 mg/0.3 mL AtIn Inject 0.3 mLs (0.3 mg total) into the muscle once as needed. 1 mL 0    ofloxacin (OCUFLOX) 0.3 % ophthalmic solution Apply 2-3 drops in right ear twice daily for two weeks 5 mL 1    prednisoLONE acetate (PRED FORTE) 1 % DrpS Apply 2-3 drops to right ear twice daily for 2 weeks 5 mL 1    [] methylPREDNISolone acetate injection 40 mg        No facility-administered encounter medications on file as of 2019.        Physical Exam:    Vitals:    19 1407   BP: 112/78   Pulse: 67   Temp: 98.3 °F (36.8 °C)       Constitutional  General Appearance: well nourished, well-developed, alert, oriented, in no acute distress  Communication: ability, understanding, voice quality normal  Head and Face  Inspection: normocephalic, atraumatic, no scars, lesions or masses    Palpation: no stepoffs, sinus tenderness or masses  Parotid glands: no masses, stones, swelling or tenderness  Eyes  Ocular Motility / Alignment: normal alignment, motility, no proptosis, enophthalmus or nystagmus  Conjunctiva: not injected  Eyelids: no entropion or ectropion, no edema  Ears  Hearing: speech reception thresholds grossly normal; Maynard: right Rinne: BC>AC right, AC>BC left     External Ears: no auricle lesions, non-tender, mobile to palpation  Otoscopy:  Right Ear: EAC full of pus and debris - see procedure note  Left Ear: no tympanic membrane lesions or perforations, purulent effusion, normal EAC  Nose  External Nose: no lesions, tenderness, trauma or deformity  Intranasal Exam: slight leftward septal deviation, mild mucosal edema   Oral Cavity / Oropharynx  Lips: upper and lower lips pink and moist  Teeth: dentition good  Gingiva: healthy  Oral Mucosa: moist, no mucosal lesions  Floor of Mouth: normal, no  lesions, salivary ducts patent  Tongue: moist, normal mobility, no lesions  Palate: soft and hard palates without lesions or ulcers  Oropharynx: tonsils and walls without erythema, exudate, lesions, fullness or obstruction  Neck  Inspection and Palpation: no erythema, induration, emphysema, tenderness or masses  Larynx and Trachea: normal position and mobility   Thyroid: no tenderness, enlargement or nodules  Submandibular Glands: no masses or tenderness  Lymphatic:  Anterior, Posterior, Submandibular, Submental, Supraclavicular: no lymphadenopathy present  Chest / Respiratory  Chest: no stridor or retractions, normal effort and expansion  Cardiovascular:  Pulses: 2+ radial pulses, regular rhythm and rate  Auscultation: deferred  Neurological  Cranial Nerves: grossly intact  General: no focal deficits  Psychiatric  Orientation: oriented to time, place and person  Mood and Affect: no depression, anxiety or agitation  Extremities  Inspection: moves all extremities well    Procedures:  Procedure: Ear microscopy with removal of purulent debris    Indications: purulent debris obstructing view of tympanic membrane    Anesthesia: None    Complications: None    Examination of the right ear canal reveals purulent debris which prevents adequate visualization of the tympanic membrane. Canal edematous but patent.  Under microscopic magnification, removal of the purulent debris was performed using a  5 Fr suction. The tympanic membrane appeared thickened with a coat of purulent on drum. Difficult to appreciate a perforation but not visualized well due to edema and purulence. Left ear canal clear. TM intact with purulent effusion. Patient tolerated the procedure well    Pertinent Data:  ? LABS:   ? AUDIO:    ? PATH:      I personally reviewed the following pertinent data at today's visit:    Imaging:   ? XRAY:  ? Ultrasound:  ? CT Scan:  ? MRI Scan:  ? PET/CT Scan:    I personally reviewed the following images:    Summary of  Outside Records:      Assessment and Plan:  Sarthak Caballero is a 39 y.o. male with     Otitis externa of right ear, unspecified chronicity, unspecified type  -     ofloxacin (OCUFLOX) 0.3 % ophthalmic solution; Apply 2-3 drops in right ear twice daily for two weeks  Dispense: 5 mL; Refill: 1  -     prednisoLONE acetate (PRED FORTE) 1 % DrpS; Apply 2-3 drops to right ear twice daily for 2 weeks  Dispense: 5 mL; Refill: 1    Acute suppurative otitis media of left ear without spontaneous rupture of tympanic membrane, recurrence not specified  -     amoxicillin-clavulanate 875-125mg (AUGMENTIN) 875-125 mg per tablet; Take 1 tablet by mouth 2 (two) times daily. for 4 days  Dispense: 8 tablet; Refill: 0    Other orders  -     methylPREDNISolone acetate injection 40 mg       Patient plans on flying after thanksgiving. Would not recommend at this time given purulent effusion. Will have him start drops in the right ear in addition to oral steroids to see if we can clear up left infection. Given 4 more days of augmentin to complete full 2 week course. Steroid shot today and oral steroids as prescribed      RTC next week before leaving for re-evaluation        ECandace Real MD  [facility]

## 2019-11-24 NOTE — PROCEDURES
Procedure: Ear microscopy with removal of purulent debris    Indications: purulent debris obstructing view of tympanic membrane    Anesthesia: None    Complications: None    Examination of the right ear canal reveals purulent debris which prevents adequate visualization of the tympanic membrane. Canal edematous but patent.  Under microscopic magnification, removal of the purulent debris was performed using a  5 Fr suction. The tympanic membrane appeared thickened with a coat of purulent on drum. Difficult to appreciate a perforation but not visualized well due to edema and purulence. Left ear canal clear. TM intact with purulent effusion. Patient tolerated the procedure well

## 2019-11-25 ENCOUNTER — OFFICE VISIT (OUTPATIENT)
Dept: OTOLARYNGOLOGY | Facility: CLINIC | Age: 39
End: 2019-11-25
Payer: COMMERCIAL

## 2019-11-25 VITALS
DIASTOLIC BLOOD PRESSURE: 80 MMHG | WEIGHT: 224.75 LBS | BODY MASS INDEX: 29.79 KG/M2 | HEART RATE: 69 BPM | HEIGHT: 73 IN | SYSTOLIC BLOOD PRESSURE: 114 MMHG

## 2019-11-25 DIAGNOSIS — H66.011 NON-RECURRENT ACUTE SUPPURATIVE OTITIS MEDIA OF RIGHT EAR WITH SPONTANEOUS RUPTURE OF TYMPANIC MEMBRANE: ICD-10-CM

## 2019-11-25 DIAGNOSIS — H90.0 CONDUCTIVE HEARING LOSS OF BOTH EARS: ICD-10-CM

## 2019-11-25 DIAGNOSIS — H66.002 ACUTE SUPPURATIVE OTITIS MEDIA OF LEFT EAR WITHOUT SPONTANEOUS RUPTURE OF TYMPANIC MEMBRANE, RECURRENCE NOT SPECIFIED: Primary | ICD-10-CM

## 2019-11-25 PROCEDURE — 3008F BODY MASS INDEX DOCD: CPT | Mod: CPTII,S$GLB,, | Performed by: OTOLARYNGOLOGY

## 2019-11-25 PROCEDURE — 99213 OFFICE O/P EST LOW 20 MIN: CPT | Mod: S$GLB,,, | Performed by: OTOLARYNGOLOGY

## 2019-11-25 PROCEDURE — 99213 PR OFFICE/OUTPT VISIT, EST, LEVL III, 20-29 MIN: ICD-10-PCS | Mod: S$GLB,,, | Performed by: OTOLARYNGOLOGY

## 2019-11-25 PROCEDURE — 99999 PR PBB SHADOW E&M-EST. PATIENT-LVL III: CPT | Mod: PBBFAC,,, | Performed by: OTOLARYNGOLOGY

## 2019-11-25 PROCEDURE — 3008F PR BODY MASS INDEX (BMI) DOCUMENTED: ICD-10-PCS | Mod: CPTII,S$GLB,, | Performed by: OTOLARYNGOLOGY

## 2019-11-25 PROCEDURE — 99999 PR PBB SHADOW E&M-EST. PATIENT-LVL III: ICD-10-PCS | Mod: PBBFAC,,, | Performed by: OTOLARYNGOLOGY

## 2019-11-25 NOTE — PROGRESS NOTES
Otolaryngology Clinic Note    Alpha English  Encounter Date: 11/25/2019   YOB: 1980  Referring Physician: Aaareferral Self  No address on file   PCP: Johnnie Harris MD    Chief Complaint:   Chief Complaint   Patient presents with    Follow-up     right ear is not leaking like it was. No pain in left ear.        HPI: Sarthak Caballero is a 39 y.o. male here for decreased hearing and right ear drainage. Has had decreased hearing for years. Drainage has been for a week. No drops. On augmentin and prednisone. No prior ear surgeries. No prior infections. Of note did have FESS end of 2015. Still has congestion. Denies facial pressure or pain. No nasal sprays.     11/25/19: Here today for follow up before leaving town tomorrow. He is feeling much better. Denies any ear pain. Still with decreased hearing but maybe slightly better. Minimal drainage out of right ear. Using drops. No fevers    Review of Systems   Constitutional: Negative for fever.   HENT: Positive for ear discharge and hearing loss. Negative for tinnitus.         Review of patient's allergies indicates:   Allergen Reactions    Grass pollen-bermuda, standard Shortness Of Breath       Past Medical History:   Diagnosis Date    Allergy     Asthma     Environmental allergies        Past Surgical History:   Procedure Laterality Date    SINUS SURGERY  10/2015    FESS x2       Social History     Socioeconomic History    Marital status: Single     Spouse name: Not on file    Number of children: Not on file    Years of education: Not on file    Highest education level: Not on file   Occupational History    Not on file   Social Needs    Financial resource strain: Not on file    Food insecurity:     Worry: Not on file     Inability: Not on file    Transportation needs:     Medical: Not on file     Non-medical: Not on file   Tobacco Use    Smoking status: Never Smoker    Smokeless tobacco: Never Used   Substance and Sexual Activity    Alcohol use:  No    Drug use: No    Sexual activity: Not Currently     Partners: Female   Lifestyle    Physical activity:     Days per week: Not on file     Minutes per session: Not on file    Stress: Not on file   Relationships    Social connections:     Talks on phone: Not on file     Gets together: Not on file     Attends [employer] service: Not on file     Active member of club or organization: Not on file     Attends meetings of clubs or organizations: Not on file     Relationship status: Not on file   Other Topics Concern    Not on file   Social History Narrative    Not on file       Family History   Problem Relation Age of Onset    Hypertension Mother     Heart disease Father     Cancer Maternal Aunt         Breast    Heart disease Maternal Uncle     Stroke Maternal Grandmother        Outpatient Encounter Medications as of 11/25/2019   Medication Sig Dispense Refill    albuterol 90 mcg/actuation inhaler Inhale 2 puffs into the lungs every 4 (four) hours as needed for Wheezing. 1 Inhaler 3    amoxicillin-clavulanate 875-125mg (AUGMENTIN) 875-125 mg per tablet Take 1 tablet by mouth 2 (two) times daily. for 4 days 8 tablet 0    azelastine (ASTELIN) 137 mcg (0.1 %) nasal spray 1 spray (137 mcg total) by Nasal route 2 (two) times daily. for 14 days 30 mL 0    fluticasone-salmeterol diskus inhaler 500-50 mcg Inhale 1 puff into the lungs 2 (two) times daily. 1 Inhaler 5    ofloxacin (OCUFLOX) 0.3 % ophthalmic solution Apply 2-3 drops in right ear twice daily for two weeks 5 mL 1    prednisoLONE acetate (PRED FORTE) 1 % DrpS Apply 2-3 drops to right ear twice daily for 2 weeks 5 mL 1    predniSONE (DELTASONE) 10 MG tablet Take 2 pills a day for 3 days then 1 pill a day for 3 days 9 tablet 0    budesonide 1 mg/2 mL NbSp Inhale 1 ampule into the lungs 2 (two) times daily. 180 mL 0    EPINEPHrine (EPIPEN) 0.3 mg/0.3 mL AtIn Inject 0.3 mLs (0.3 mg total) into the muscle once as needed. 1 mL 0     No  facility-administered encounter medications on file as of 11/25/2019.        Physical Exam:    Vitals:    11/25/19 1303   BP: 114/80   Pulse: 69       Constitutional  General Appearance: well nourished, well-developed, alert, oriented, in no acute distress  Communication: ability, understanding, voice quality normal  Head and Face  Inspection: normocephalic, atraumatic, no scars, lesions or masses      Eyes  Ocular Motility / Alignment: normal alignment, motility, no proptosis, enophthalmus or nystagmus  Conjunctiva: not injected  Eyelids: no entropion or ectropion, no edema  Ears  Hearing: speech reception thresholds grossly normal; Maynard: left Rinne: BC>AC right, AC>BC left     External Ears: no auricle lesions, non-tender, mobile to palpation  Otoscopy:  Right Ear: EAC with minimal otorrhea, tympanic membrane appears thickened, perforation anteriorly that looks to be approximately 15%, unable to see anterior edge of it though  Left Ear: no tympanic membrane lesions or perforations, drum no longer bulging, slight retraction but he is able to auto insufflate, + effusion that looks slightly cloudy but not frankly purulent like before, normal EAC  Nose  External Nose: no lesions, tenderness, trauma or deformity  Intranasal Exam: slight leftward septal deviation, mild mucosal edema   Oral Cavity / Oropharynx  Lips: upper and lower lips pink and moist  Oral Mucosa: moist, no mucosal lesions  Tongue: moist, normal mobility, no lesions  Palate: soft and hard palates without lesions or ulcers  Oropharynx: tonsils and walls without erythema, exudate, lesions, fullness or obstruction  Neck  Inspection and Palpation: no erythema, induration, emphysema, tenderness or masses    Lymphatic:  Anterior, Posterior, Submandibular, Submental, Supraclavicular: no lymphadenopathy present  Chest / Respiratory  Chest: no stridor or retractions, normal effort and expansion  Neurological  Cranial Nerves: grossly intact  General: no focal  deficits  Psychiatric  Orientation: oriented to time, place and person  Mood and Affect: no depression, anxiety or agitation      Procedures:  No notes on file    Pertinent Data:  ? LABS:   ? AUDIO:    ? PATH:      I personally reviewed the following pertinent data at today's visit:    Imaging:   ? XRAY:  ? Ultrasound:  ? CT Scan:  ? MRI Scan:  ? PET/CT Scan:    I personally reviewed the following images:    Summary of Outside Records:      Assessment and Plan:  Sarthak Caballero is a 39 y.o. male with     Acute suppurative otitis media of left ear without spontaneous rupture of tympanic membrane, recurrence not specified    Non-recurrent acute suppurative otitis media of right ear with spontaneous rupture of tympanic membrane       Patient plans on flying Saturday morning. He is much better today. I am optimistic that the acute infection will be completely cleared by Friday. Discussed possible myringotomy before flying. Discussed possibility of pain and rupture of TM if infected still. He would like to continue current medical management for now which I believe is best. He will let us know how he is on Thursday and if we need to get him in Urgently Friday we will     RTC in 4 weeks or sooner if worse        E. Grier de Lauréal, MD [facility]

## 2019-11-25 NOTE — PATIENT INSTRUCTIONS
Continue current course of antibiotics and steroids. Continue drops to the right ear for a total of 14 days. Keep that ear dry with cotton ball with showering. Do not need to put antibiotics in the left ear unless it starts draining.    Continue gentle autoinsufflation. Flonase twice daily. Use Afrin or other nasal decongestant and take sudafed 30 minutes prior to flying.    If not better by Friday, let us know before flying.

## 2019-12-20 RX ORDER — FLUTICASONE PROPIONATE AND SALMETEROL 500; 50 UG/1; UG/1
POWDER RESPIRATORY (INHALATION)
Qty: 1 INHALER | Refills: 5 | Status: SHIPPED | OUTPATIENT
Start: 2019-12-20 | End: 2021-07-20 | Stop reason: SDUPTHER

## 2019-12-23 ENCOUNTER — PATIENT OUTREACH (OUTPATIENT)
Dept: ADMINISTRATIVE | Facility: OTHER | Age: 39
End: 2019-12-23

## 2019-12-27 ENCOUNTER — OFFICE VISIT (OUTPATIENT)
Dept: OTOLARYNGOLOGY | Facility: CLINIC | Age: 39
End: 2019-12-27
Payer: COMMERCIAL

## 2019-12-27 VITALS
SYSTOLIC BLOOD PRESSURE: 115 MMHG | WEIGHT: 220.25 LBS | BODY MASS INDEX: 29.19 KG/M2 | DIASTOLIC BLOOD PRESSURE: 82 MMHG | HEART RATE: 85 BPM | TEMPERATURE: 98 F | HEIGHT: 73 IN

## 2019-12-27 DIAGNOSIS — Z86.69 HISTORY OF PERFORATION OF TYMPANIC MEMBRANE: ICD-10-CM

## 2019-12-27 DIAGNOSIS — Z86.69 HISTORY OF OTITIS MEDIA: ICD-10-CM

## 2019-12-27 DIAGNOSIS — H90.0 CONDUCTIVE HEARING LOSS OF BOTH EARS: Primary | ICD-10-CM

## 2019-12-27 PROCEDURE — 99213 OFFICE O/P EST LOW 20 MIN: CPT | Mod: S$GLB,,, | Performed by: OTOLARYNGOLOGY

## 2019-12-27 PROCEDURE — 99213 PR OFFICE/OUTPT VISIT, EST, LEVL III, 20-29 MIN: ICD-10-PCS | Mod: S$GLB,,, | Performed by: OTOLARYNGOLOGY

## 2019-12-27 PROCEDURE — 3008F BODY MASS INDEX DOCD: CPT | Mod: CPTII,S$GLB,, | Performed by: OTOLARYNGOLOGY

## 2019-12-27 PROCEDURE — 3008F PR BODY MASS INDEX (BMI) DOCUMENTED: ICD-10-PCS | Mod: CPTII,S$GLB,, | Performed by: OTOLARYNGOLOGY

## 2019-12-27 PROCEDURE — 99999 PR PBB SHADOW E&M-EST. PATIENT-LVL III: ICD-10-PCS | Mod: PBBFAC,,, | Performed by: OTOLARYNGOLOGY

## 2019-12-27 PROCEDURE — 99999 PR PBB SHADOW E&M-EST. PATIENT-LVL III: CPT | Mod: PBBFAC,,, | Performed by: OTOLARYNGOLOGY

## 2019-12-27 NOTE — PROGRESS NOTES
Otolaryngology Clinic Note    Alpha English  Encounter Date: 12/27/2019   YOB: 1980  Referring Physician: Aaareferral Self  No address on file   PCP: Johnnie Harris MD    Chief Complaint:   Chief Complaint   Patient presents with    Follow-up     ears are a little itchy but nothing like they were       HPI: Sarthak Caballero is a 39 y.o. male here for follow up of ear infection. Noted to have bilateral otitis media with spontaneous rupture of right TM. Today he reports he feels his hearing is back to normal. He has not had any pain or drainage. No tinnitus. No fevers. Occasional itching of ears.      Review of Systems   Constitutional: Negative for fever.   HENT: Negative for congestion, ear discharge, ear pain, hearing loss, sinus pain and tinnitus.         Review of patient's allergies indicates:   Allergen Reactions    Grass pollen-bermuda, standard Shortness Of Breath       Past Medical History:   Diagnosis Date    Allergy     Asthma     Environmental allergies        Past Surgical History:   Procedure Laterality Date    SINUS SURGERY  10/2015    FESS x2       Social History     Socioeconomic History    Marital status: Single     Spouse name: Not on file    Number of children: Not on file    Years of education: Not on file    Highest education level: Not on file   Occupational History    Not on file   Social Needs    Financial resource strain: Not on file    Food insecurity:     Worry: Not on file     Inability: Not on file    Transportation needs:     Medical: Not on file     Non-medical: Not on file   Tobacco Use    Smoking status: Never Smoker    Smokeless tobacco: Never Used   Substance and Sexual Activity    Alcohol use: No    Drug use: No    Sexual activity: Not Currently     Partners: Female   Lifestyle    Physical activity:     Days per week: Not on file     Minutes per session: Not on file    Stress: Not on file   Relationships    Social connections:     Talks on phone: Not  on file     Gets together: Not on file     Attends [employer] service: Not on file     Active member of club or organization: Not on file     Attends meetings of clubs or organizations: Not on file     Relationship status: Not on file   Other Topics Concern    Not on file   Social History Narrative    Not on file       Family History   Problem Relation Age of Onset    Hypertension Mother     Heart disease Father     Cancer Maternal Aunt         Breast    Heart disease Maternal Uncle     Stroke Maternal Grandmother        Outpatient Encounter Medications as of 12/27/2019   Medication Sig Dispense Refill    albuterol 90 mcg/actuation inhaler Inhale 2 puffs into the lungs every 4 (four) hours as needed for Wheezing. 1 Inhaler 3    ofloxacin (OCUFLOX) 0.3 % ophthalmic solution Apply 2-3 drops in right ear twice daily for two weeks 5 mL 1    prednisoLONE acetate (PRED FORTE) 1 % DrpS Apply 2-3 drops to right ear twice daily for 2 weeks 5 mL 1    predniSONE (DELTASONE) 10 MG tablet Take 2 pills a day for 3 days then 1 pill a day for 3 days 9 tablet 0    WIXELA INHUB 500-50 mcg/dose DsDv diskus inhaler TAKE 1 PUFF BY MOUTH TWICE A DAY 1 Inhaler 5    azelastine (ASTELIN) 137 mcg (0.1 %) nasal spray 1 spray (137 mcg total) by Nasal route 2 (two) times daily. for 14 days 30 mL 0    budesonide 1 mg/2 mL NbSp Inhale 1 ampule into the lungs 2 (two) times daily. 180 mL 0    EPINEPHrine (EPIPEN) 0.3 mg/0.3 mL AtIn Inject 0.3 mLs (0.3 mg total) into the muscle once as needed. 1 mL 0     No facility-administered encounter medications on file as of 12/27/2019.        Physical Exam:    Vitals:    12/27/19 0913   BP: 115/82   Pulse: 85   Temp: 97.6 °F (36.4 °C)       Constitutional  General Appearance: well nourished, well-developed, alert, oriented, in no acute distress  Communication: ability, understanding, voice quality normal  Head and Face  Inspection: normocephalic, atraumatic, no scars, lesions or masses       Eyes  Ocular Motility / Alignment: normal alignment, motility, no proptosis, enophthalmus or nystagmus  Conjunctiva: not injected  Eyelids: no entropion or ectropion, no edema  Ears  Hearing: speech reception thresholds grossly normal; Maynard: midline Rinne: AC>BC   bilaterally  External Ears: no auricle lesions, non-tender, mobile to palpation  Otoscopy:  Right Ear: no tympanic membrane lesions or perforations, no effusion, normal EAC - perforation healed  Left Ear: no tympanic membrane lesions or perforations, no effusion, normal EAC  Type A tymps  Nose  External Nose: no lesions, tenderness, trauma or deformity  Intranasal Exam: slight leftward septal deviation, mild mucosal edema   Oral Cavity / Oropharynx  Lips: upper and lower lips pink and moist  Oral Mucosa: moist, no mucosal lesions  Tongue: moist, normal mobility, no lesions  Palate: soft and hard palates without lesions or ulcers  Oropharynx: tonsils and walls without erythema, exudate, lesions, fullness or obstruction  Neck  Inspection and Palpation: no erythema, induration, emphysema, tenderness or masses    Lymphatic:  Anterior, Posterior, Submandibular, Submental, Supraclavicular: no lymphadenopathy present  Chest / Respiratory  Chest: no stridor or retractions, normal effort and expansion  Neurological  Cranial Nerves: grossly intact  General: no focal deficits  Psychiatric  Orientation: oriented to time, place and person  Mood and Affect: no depression, anxiety or agitation      Procedures:  No notes on file    Pertinent Data:  ? LABS:   ? AUDIO:    ? PATH:      I personally reviewed the following pertinent data at today's visit:    Imaging:   ? XRAY:  ? Ultrasound:  ? CT Scan:  ? MRI Scan:  ? PET/CT Scan:    I personally reviewed the following images:    Summary of Outside Records:      Assessment and Plan:  Sarthak Caballero is a 39 y.o. male with     Conductive hearing loss of both ears    Non-recurrent acute suppurative otitis media of right ear  with spontaneous rupture of tympanic membrane    Acute suppurative otitis media of left ear without spontaneous rupture of tympanic membrane, recurrence not specified       Otitis media resolved. Perforation healed. Hearing subjectively back to normal with normal fork exam, normal tymps.    Will repeat audiogram to confirm hearing back to normal. RTC with audio      ECandace Real MD  [facility]

## 2020-01-16 ENCOUNTER — PATIENT OUTREACH (OUTPATIENT)
Dept: ADMINISTRATIVE | Facility: OTHER | Age: 40
End: 2020-01-16

## 2020-01-21 ENCOUNTER — CLINICAL SUPPORT (OUTPATIENT)
Dept: OTOLARYNGOLOGY | Facility: CLINIC | Age: 40
End: 2020-01-21
Payer: COMMERCIAL

## 2020-01-21 ENCOUNTER — OFFICE VISIT (OUTPATIENT)
Dept: OTOLARYNGOLOGY | Facility: CLINIC | Age: 40
End: 2020-01-21
Payer: COMMERCIAL

## 2020-01-21 VITALS
SYSTOLIC BLOOD PRESSURE: 110 MMHG | DIASTOLIC BLOOD PRESSURE: 68 MMHG | HEIGHT: 73 IN | BODY MASS INDEX: 28.52 KG/M2 | TEMPERATURE: 98 F | HEART RATE: 73 BPM | WEIGHT: 215.19 LBS

## 2020-01-21 DIAGNOSIS — H90.0 CONDUCTIVE HEARING LOSS OF BOTH EARS: Primary | ICD-10-CM

## 2020-01-21 DIAGNOSIS — J33.9 NASAL POLYPOSIS: ICD-10-CM

## 2020-01-21 DIAGNOSIS — J30.9 CHRONIC ALLERGIC RHINITIS: ICD-10-CM

## 2020-01-21 DIAGNOSIS — J32.4 CHRONIC PANSINUSITIS: ICD-10-CM

## 2020-01-21 DIAGNOSIS — Z86.69 HISTORY OF PERFORATION OF TYMPANIC MEMBRANE: ICD-10-CM

## 2020-01-21 PROCEDURE — 99999 PR PBB SHADOW E&M-EST. PATIENT-LVL I: CPT | Mod: PBBFAC,,, | Performed by: AUDIOLOGIST-HEARING AID FITTER

## 2020-01-21 PROCEDURE — 99213 PR OFFICE/OUTPT VISIT, EST, LEVL III, 20-29 MIN: ICD-10-PCS | Mod: 25,S$GLB,, | Performed by: OTOLARYNGOLOGY

## 2020-01-21 PROCEDURE — 92567 TYMPANOMETRY: CPT | Mod: S$GLB,,, | Performed by: AUDIOLOGIST-HEARING AID FITTER

## 2020-01-21 PROCEDURE — 99213 OFFICE O/P EST LOW 20 MIN: CPT | Mod: 25,S$GLB,, | Performed by: OTOLARYNGOLOGY

## 2020-01-21 PROCEDURE — 92552 PURE TONE AUDIOMETRY AIR: CPT | Mod: S$GLB,,, | Performed by: AUDIOLOGIST-HEARING AID FITTER

## 2020-01-21 PROCEDURE — 3008F BODY MASS INDEX DOCD: CPT | Mod: CPTII,S$GLB,, | Performed by: OTOLARYNGOLOGY

## 2020-01-21 PROCEDURE — 99999 PR PBB SHADOW E&M-EST. PATIENT-LVL III: CPT | Mod: PBBFAC,,, | Performed by: OTOLARYNGOLOGY

## 2020-01-21 PROCEDURE — 99999 PR PBB SHADOW E&M-EST. PATIENT-LVL I: ICD-10-PCS | Mod: PBBFAC,,, | Performed by: AUDIOLOGIST-HEARING AID FITTER

## 2020-01-21 PROCEDURE — 99999 PR PBB SHADOW E&M-EST. PATIENT-LVL III: ICD-10-PCS | Mod: PBBFAC,,, | Performed by: OTOLARYNGOLOGY

## 2020-01-21 PROCEDURE — 31231 PR NASAL ENDOSCOPY, DX: ICD-10-PCS | Mod: S$GLB,,, | Performed by: OTOLARYNGOLOGY

## 2020-01-21 PROCEDURE — 31231 NASAL ENDOSCOPY DX: CPT | Mod: S$GLB,,, | Performed by: OTOLARYNGOLOGY

## 2020-01-21 PROCEDURE — 3008F PR BODY MASS INDEX (BMI) DOCUMENTED: ICD-10-PCS | Mod: CPTII,S$GLB,, | Performed by: OTOLARYNGOLOGY

## 2020-01-21 PROCEDURE — 92552 PR PURE TONE AUDIOMETRY, AIR: ICD-10-PCS | Mod: S$GLB,,, | Performed by: AUDIOLOGIST-HEARING AID FITTER

## 2020-01-21 PROCEDURE — 92567 PR TYMPA2METRY: ICD-10-PCS | Mod: S$GLB,,, | Performed by: AUDIOLOGIST-HEARING AID FITTER

## 2020-01-21 RX ORDER — PREDNISONE 20 MG/1
TABLET ORAL
Qty: 33 TABLET | Refills: 0 | Status: SHIPPED | OUTPATIENT
Start: 2020-01-21 | End: 2020-02-10

## 2020-01-21 RX ORDER — DOXYCYCLINE 100 MG/1
100 CAPSULE ORAL EVERY 12 HOURS
Qty: 42 CAPSULE | Refills: 0 | Status: SHIPPED | OUTPATIENT
Start: 2020-01-21 | End: 2020-02-11

## 2020-01-21 NOTE — PROGRESS NOTES
Otolaryngology Clinic Note    Alpha English  Encounter Date: 1/21/2020   YOB: 1980  Referring Physician: Zhanna Powers Md  [address]  [address]  [place] [id number]   PCP: Johnnie Harris MD    Chief Complaint:   Chief Complaint   Patient presents with    Hearing Loss       HPI: Sarthak Caballero is a 39 y.o. male here for follow up audiogram. Patient with bilateral otitis media with spontaneous rupture of right TM in November. At least visit ears were clear, perforation had appeared to be healed and hearing had returned to normal per patient.     Today he reports hearing normal. Ears still get clogged feeling like they need to pop every now and then. Has been having worsening facial pressure, congestion and obstruction. Lots of thick rhinorrhea. History of revision FESS with Dr Mcneill in 10/2015. First surgery in 10/2013. No recent imaging. Was seeing Dr Nieto for allergy. Was on Orolair but insurance stopped covering it so he has been since June 2017.     Currently not on any rinse or spray.      Review of Systems   Constitutional: Negative for fever.   HENT: Positive for congestion and sinus pain. Negative for ear discharge, ear pain, hearing loss and tinnitus.         Review of patient's allergies indicates:   Allergen Reactions    Grass pollen-bermuda, standard Shortness Of Breath       Past Medical History:   Diagnosis Date    Allergy     Asthma     Environmental allergies        Past Surgical History:   Procedure Laterality Date    SINUS SURGERY  10/2015    FESS x2       Social History     Socioeconomic History    Marital status: Single     Spouse name: Not on file    Number of children: Not on file    Years of education: Not on file    Highest education level: Not on file   Occupational History    Not on file   Social Needs    Financial resource strain: Not on file    Food insecurity:     Worry: Not on file     Inability: Not on file    Transportation needs:     Medical:  Not on file     Non-medical: Not on file   Tobacco Use    Smoking status: Never Smoker    Smokeless tobacco: Never Used   Substance and Sexual Activity    Alcohol use: No    Drug use: No    Sexual activity: Not Currently     Partners: Female   Lifestyle    Physical activity:     Days per week: Not on file     Minutes per session: Not on file    Stress: Not on file   Relationships    Social connections:     Talks on phone: Not on file     Gets together: Not on file     Attends Mu-ism service: Not on file     Active member of club or organization: Not on file     Attends meetings of clubs or organizations: Not on file     Relationship status: Not on file   Other Topics Concern    Not on file   Social History Narrative    Not on file       Family History   Problem Relation Age of Onset    Hypertension Mother     Heart disease Father     Cancer Maternal Aunt         Breast    Heart disease Maternal Uncle     Stroke Maternal Grandmother        Outpatient Encounter Medications as of 1/21/2020   Medication Sig Dispense Refill    albuterol 90 mcg/actuation inhaler Inhale 2 puffs into the lungs every 4 (four) hours as needed for Wheezing. 1 Inhaler 3    WIXELA INHUB 500-50 mcg/dose DsDv diskus inhaler TAKE 1 PUFF BY MOUTH TWICE A DAY 1 Inhaler 5    azelastine (ASTELIN) 137 mcg (0.1 %) nasal spray 1 spray (137 mcg total) by Nasal route 2 (two) times daily. for 14 days 30 mL 0    budesonide 1 mg/2 mL NbSp Inhale 1 ampule into the lungs 2 (two) times daily. 180 mL 0    doxycycline (VIBRAMYCIN) 100 MG Cap Take 1 capsule (100 mg total) by mouth every 12 (twelve) hours. for 21 days 42 capsule 0    EPINEPHrine (EPIPEN) 0.3 mg/0.3 mL AtIn Inject 0.3 mLs (0.3 mg total) into the muscle once as needed. 1 mL 0    ofloxacin (OCUFLOX) 0.3 % ophthalmic solution Apply 2-3 drops in right ear twice daily for two weeks 5 mL 1    prednisoLONE acetate (PRED FORTE) 1 % DrpS Apply 2-3 drops to right ear twice daily for 2  weeks 5 mL 1    predniSONE (DELTASONE) 20 MG tablet Take 3 tablets (60 mg total) by mouth once daily for 5 days, THEN 2 tablets (40 mg total) once daily for 5 days, THEN 1 tablet (20 mg total) once daily for 5 days, THEN 0.5 tablets (10 mg total) once daily for 5 days. 33 tablet 0    [DISCONTINUED] predniSONE (DELTASONE) 10 MG tablet Take 2 pills a day for 3 days then 1 pill a day for 3 days 9 tablet 0     No facility-administered encounter medications on file as of 1/21/2020.        Physical Exam:    Vitals:    01/21/20 1501   BP: 110/68   Pulse: 73   Temp: 98 °F (36.7 °C)       Constitutional  General Appearance: well nourished, well-developed, alert, oriented, in no acute distress  Communication: ability, understanding, voice quality normal  Head and Face  Inspection: normocephalic, atraumatic, no scars, lesions or masses      Eyes  Ocular Motility / Alignment: normal alignment, motility, no proptosis, enophthalmus or nystagmus  Conjunctiva: not injected  Eyelids: no entropion or ectropion, no edema  Ears  Hearing: speech reception thresholds grossly normal; Maynard: midline Rinne: AC>BC   bilaterally  External Ears: no auricle lesions, non-tender, mobile to palpation  Otoscopy:  Right Ear: no tympanic membrane lesions or perforations, no effusion, normal EAC   Left Ear: no tympanic membrane lesions or perforations, no effusion, normal EAC  Nose  External Nose: no lesions, tenderness, trauma or deformity  Intranasal Exam: slight leftward septal deviation - see procedure note  Oral Cavity / Oropharynx  Lips: upper and lower lips pink and moist  Oral Mucosa: moist, no mucosal lesions  Tongue: moist, normal mobility, no lesions  Palate: soft and hard palates without lesions or ulcers  Oropharynx: tonsils and walls without erythema, exudate, lesions, fullness or obstruction  Neck  Inspection and Palpation: no erythema, induration, emphysema, tenderness or masses    Lymphatic:  Anterior, Posterior, Submandibular,  Submental, Supraclavicular: no lymphadenopathy present  Chest / Respiratory  Chest: no stridor or retractions, normal effort and expansion  Neurological  Cranial Nerves: grossly intact  General: no focal deficits  Psychiatric  Orientation: oriented to time, place and person  Mood and Affect: no depression, anxiety or agitation      Procedures:  Procedure: Nasal endoscopy    Anesthesia: Topical xylocaine with ismael-synephrine    Complications: None    Findings: severe mucosal edema, appears to have some polyposis in the left middle meatus as well as right although difficult to visualize well due to edema    Procedure in Detail: After verbal consent obtained and risks, benefits and alternatives discussed with patient, nares were decongested and anesthetized, and a rigid nasal endoscope was passed with following results:  Septum deviated to the left. Inferior turbinates with severe hypertrophy. Middle turbinates edematous. Superior turbinates not visualized. Middle meatus edematous - appears to have polyps on the left although difficult to see due to diffuse edema. Some mucopurulent drainage as well.        Patient tolerated the procedure well.    Pertinent Data:  ? LABS:   ? AUDIO:        ? PATH:      I personally reviewed the following pertinent data at today's visit:    Imaging:   ? XRAY:  ? Ultrasound:  ? CT Scan:  ? MRI Scan:  ? PET/CT Scan:    I personally reviewed the following images:    Summary of Outside Records:      Assessment and Plan:  Sarthak Caballero is a 39 y.o. male with     Conductive hearing loss of both ears    History of perforation of tympanic membrane    Nasal polyposis  -     doxycycline (VIBRAMYCIN) 100 MG Cap; Take 1 capsule (100 mg total) by mouth every 12 (twelve) hours. for 21 days  Dispense: 42 capsule; Refill: 0  -     predniSONE (DELTASONE) 20 MG tablet; Take 3 tablets (60 mg total) by mouth once daily for 5 days, THEN 2 tablets (40 mg total) once daily for 5 days, THEN 1 tablet (20 mg  total) once daily for 5 days, THEN 0.5 tablets (10 mg total) once daily for 5 days.  Dispense: 33 tablet; Refill: 0    Chronic pansinusitis  -     doxycycline (VIBRAMYCIN) 100 MG Cap; Take 1 capsule (100 mg total) by mouth every 12 (twelve) hours. for 21 days  Dispense: 42 capsule; Refill: 0  -     predniSONE (DELTASONE) 20 MG tablet; Take 3 tablets (60 mg total) by mouth once daily for 5 days, THEN 2 tablets (40 mg total) once daily for 5 days, THEN 1 tablet (20 mg total) once daily for 5 days, THEN 0.5 tablets (10 mg total) once daily for 5 days.  Dispense: 33 tablet; Refill: 0    Chronic allergic rhinitis      Patient likely with some recurrent polyposis and infection. Will treat with prolonged course of steroids and doxycycline. Patient with significant benefit from Oralair. Will send back to Dr Nieto to see if we can get him back on this. Patient not interested in further surgery.     E. Grier de Lauréal, MD [facility]

## 2020-01-21 NOTE — PROCEDURES
Procedure: Nasal endoscopy    Anesthesia: Topical xylocaine with ismael-synephrine    Complications: None    Findings: severe mucosal edema, appears to have some polyposis in the left middle meatus as well as right although difficult to visualize well due to edema    Procedure in Detail: After verbal consent obtained and risks, benefits and alternatives discussed with patient, nares were decongested and anesthetized, and a rigid nasal endoscope was passed with following results:  Septum deviated to the left. Inferior turbinates with severe hypertrophy. Middle turbinates edematous. Superior turbinates not visualized. Middle meatus edematous - appears to have polyps on the left although difficult to see due to diffuse edema. Some mucopurulent drainage as well.        Patient tolerated the procedure well.

## 2020-01-21 NOTE — PATIENT INSTRUCTIONS
Make follow up appointment with Dr Nieto.     Complete antibiotic and steroid course as prescribed.    Start nasal steroid rinses from [employer].

## 2020-01-24 ENCOUNTER — PATIENT OUTREACH (OUTPATIENT)
Dept: ADMINISTRATIVE | Facility: OTHER | Age: 40
End: 2020-01-24

## 2020-01-24 ENCOUNTER — OFFICE VISIT (OUTPATIENT)
Dept: ALLERGY | Facility: CLINIC | Age: 40
End: 2020-01-24
Payer: COMMERCIAL

## 2020-01-24 VITALS — TEMPERATURE: 98 F | WEIGHT: 217.81 LBS | HEIGHT: 73 IN | BODY MASS INDEX: 28.87 KG/M2

## 2020-01-24 DIAGNOSIS — J45.20 ASTHMA IN ADULT, MILD INTERMITTENT, UNCOMPLICATED: Primary | ICD-10-CM

## 2020-01-24 DIAGNOSIS — J30.89 NON-SEASONAL ALLERGIC RHINITIS DUE TO OTHER ALLERGIC TRIGGER: ICD-10-CM

## 2020-01-24 DIAGNOSIS — H10.13 ALLERGIC CONJUNCTIVITIS OF BOTH EYES: ICD-10-CM

## 2020-01-24 DIAGNOSIS — H69.90 DYSFUNCTION OF EUSTACHIAN TUBE, UNSPECIFIED LATERALITY: ICD-10-CM

## 2020-01-24 DIAGNOSIS — J33.9 NASAL POLYPOSIS: ICD-10-CM

## 2020-01-24 PROCEDURE — 3008F PR BODY MASS INDEX (BMI) DOCUMENTED: ICD-10-PCS | Mod: CPTII,S$GLB,, | Performed by: ALLERGY & IMMUNOLOGY

## 2020-01-24 PROCEDURE — 99999 PR PBB SHADOW E&M-EST. PATIENT-LVL III: ICD-10-PCS | Mod: PBBFAC,,, | Performed by: ALLERGY & IMMUNOLOGY

## 2020-01-24 PROCEDURE — 99999 PR PBB SHADOW E&M-EST. PATIENT-LVL III: CPT | Mod: PBBFAC,,, | Performed by: ALLERGY & IMMUNOLOGY

## 2020-01-24 PROCEDURE — 3008F BODY MASS INDEX DOCD: CPT | Mod: CPTII,S$GLB,, | Performed by: ALLERGY & IMMUNOLOGY

## 2020-01-24 PROCEDURE — 99214 PR OFFICE/OUTPT VISIT, EST, LEVL IV, 30-39 MIN: ICD-10-PCS | Mod: S$GLB,,, | Performed by: ALLERGY & IMMUNOLOGY

## 2020-01-24 PROCEDURE — 99214 OFFICE O/P EST MOD 30 MIN: CPT | Mod: S$GLB,,, | Performed by: ALLERGY & IMMUNOLOGY

## 2020-01-24 RX ORDER — OLOPATADINE HYDROCHLORIDE 1 MG/ML
1 SOLUTION/ DROPS OPHTHALMIC 2 TIMES DAILY
Qty: 5 ML | Refills: 6 | Status: SHIPPED | OUTPATIENT
Start: 2020-01-24 | End: 2021-06-25

## 2020-01-24 NOTE — PROGRESS NOTES
Sarthak Caballero returns to clinic today for continued evaluation of allergic rhinitis, conjunctivitis, asthma, and nasal polyposis.  He was last seen by me May 5, 2017.  He saw Dr. Wasserman on February 20, 2018.    He was doing very well on Oralair but had to discontinue due to insurance reasons.  He would like to restart.    All of his symptoms have been controlled until about three weeks ago.  He then had increased nasal congestion alternates.  He has also had ear fullness, eye itching and redness, clear rhinorrhea, mild coughing, and shortness of breath.    He has been taking Wixela BID.  He has not needed any albuterol.    He has been using Visine Pollen but does not find that is effective.  He has not been taking antihistamines.  He has restarted budesonide intranasal.    He saw Dr. Powers in ENT on January 21, 2020 who prescribed prednisone and doxycycline.  He has not yet started.    [facility] PEQ ALLERGY QUESTIONNAIRE SHORT 1/24/2020   Sinus pain? Yes   Sinus pressure? Yes   Ear discharge? Yes   Postnasal drip? Yes   Runny nose? Yes   Congestion? Yes   Throat - No symptoms Yes   Eye - No symptoms Yes   Cough? Yes   Skin - No symptoms Yes     Physical Examination:  General: Well-developed, well-nourished, no acute distress.  Head: No sinus tenderness.  Eyes: Conjunctivae:  No bulbar or palpebral conjunctival injection.  Ears: EAC's clear.  TM's clear.  No pre-auricular nodes.  Nose: Nasal Mucosa:  Edematous.  Septum: No apparent deviation.  Turbinates:  Normal.  Polyps/Mass: None seen.  Teeth/Gums:  No bleeding noted.  Oropharynx: No exudates.  Neck: Supple without thyromegaly. No cervical lymphadenopathy.    Respiratory/Chest: Effort: Good.  Auscultation:  Mild bilateral expiratory wheezes.  Skin: Good turgor.  No urticaria or angioedema.  Neuro/Psych: Oriented x 3.    Laboratory 2/4/2014:  IgE level:  467.  ImmunoCAP:  Class IV:  Bahia.  Class III:  Justus Kaur.  Class II:  Oak, RW.  Class I:  KAITLIN  ME.    IgA:  127.  Ig.  IgM:  82.  Pneumococcal titers: Not protective.    Pneumovax was given on 2014.    Laboratory 3/11/2014:  Pneumococcal titers: Improved but not protected.    Laboratory 2014:  Pneumococcal titers: Protective.    Chest x-ray 2017: Normal.    Assessment:  1. Allergic rhinitis.  2. Allergic conjunctivitis.  3. Allergic asthma, controlled.  4. Nasal polyposis.  5. S/P FESS with polypectomy 2013 and 2015.  6. Low pneumococcal titer with Pneumovax 2014, protective with Prevnar 3/25/2014.  7. Eustachian tube dysfunction.    Recommendations:  1. Restart Oralair.  2. Continue budesonide nasal rinses.  3. Continue  Wixela.  4. OTC antihistamines as needed.  5. Albuterol as needed.  6. Start prednisone and doxycycline.  7. Patanol as needed.  8. RTC in 2-3 months or sooner if needed.  He will need to come in for his first dose of Oralair.

## 2020-02-05 NOTE — TELEPHONE ENCOUNTER
----- Message from Susan Castellon sent at 2/3/2017 10:46 AM CST -----  Contact: Self/[phone]  Pt said that he is calling in regards to needing to check the status of a refill request for predniSONE (DELTASONE) 10 MG tablet to be sent to [facility]/pharmacy #[id number] pt stated that the polyps in his nose has shut everything down and he cannot breath. Please call and advise          Thank you    Detail Level: Detailed Quality 474: Zoster Vaccination Status: Shingrix Vaccination Administered or Previously Received Quality 111:Pneumonia Vaccination Status For Older Adults: Pneumococcal Vaccination Previously Received Quality 110: Preventive Care And Screening: Influenza Immunization: Influenza Immunization previously received during influenza season

## 2020-02-10 DIAGNOSIS — J30.1 SEASONAL ALLERGIC RHINITIS DUE TO POLLEN: Primary | ICD-10-CM

## 2020-02-10 RX ORDER — EPINEPHRINE 0.3 MG/.3ML
1 INJECTION SUBCUTANEOUS ONCE
Qty: 2 EACH | Refills: 5 | Status: SHIPPED | OUTPATIENT
Start: 2020-02-10 | End: 2021-05-03 | Stop reason: SDUPTHER

## 2020-02-11 ENCOUNTER — TELEPHONE (OUTPATIENT)
Dept: INTERNAL MEDICINE | Facility: CLINIC | Age: 40
End: 2020-02-11

## 2020-02-11 DIAGNOSIS — Z00.00 ANNUAL PHYSICAL EXAM: Primary | ICD-10-CM

## 2020-02-11 NOTE — TELEPHONE ENCOUNTER
Labs ordered along with urine testing. Please link orders to appointment. Please notify the patient. Please make sure urine testing is ordered for the lab visit on the 17th as well.

## 2020-02-11 NOTE — TELEPHONE ENCOUNTER
----- Message from Priya Shaw sent at 2/11/2020 10:17 AM CST -----  Contact: self/[phone]  Pt called regards to getting his epp lab orders with added std panel put into the system and linked to his appointment on 02-17-20.    Please advise

## 2020-02-11 NOTE — TELEPHONE ENCOUNTER
Patient Is requesting STD orders to be added to their orders for his scheduled lab appt on 2/17/2020

## 2020-02-13 ENCOUNTER — TELEPHONE (OUTPATIENT)
Dept: PHARMACY | Facility: CLINIC | Age: 40
End: 2020-02-13

## 2020-02-13 NOTE — TELEPHONE ENCOUNTER
LVM for callback to inform patient that [facility] received prescription for Oralair & Epipen and benefits investigation is required.  [facility] will be back in touch once insurance determination is received.

## 2020-02-17 ENCOUNTER — LAB VISIT (OUTPATIENT)
Dept: LAB | Facility: HOSPITAL | Age: 40
End: 2020-02-17
Attending: INTERNAL MEDICINE
Payer: COMMERCIAL

## 2020-02-17 DIAGNOSIS — E78.5 HYPERLIPIDEMIA, UNSPECIFIED HYPERLIPIDEMIA TYPE: ICD-10-CM

## 2020-02-17 DIAGNOSIS — Z00.00 ANNUAL PHYSICAL EXAM: ICD-10-CM

## 2020-02-17 LAB
ALBUMIN SERPL BCP-MCNC: 3.8 G/DL (ref 3.5–5.2)
ALP SERPL-CCNC: 100 U/L (ref 55–135)
ALT SERPL W/O P-5'-P-CCNC: 22 U/L (ref 10–44)
ANION GAP SERPL CALC-SCNC: 9 MMOL/L (ref 8–16)
AST SERPL-CCNC: 16 U/L (ref 10–40)
BILIRUB SERPL-MCNC: 1 MG/DL (ref 0.1–1)
BUN SERPL-MCNC: 14 MG/DL (ref 6–20)
CALCIUM SERPL-MCNC: 9.6 MG/DL (ref 8.7–10.5)
CHLORIDE SERPL-SCNC: 105 MMOL/L (ref 95–110)
CHOLEST SERPL-MCNC: 235 MG/DL (ref 120–199)
CHOLEST/HDLC SERPL: 3.6 {RATIO} (ref 2–5)
CO2 SERPL-SCNC: 27 MMOL/L (ref 23–29)
CREAT SERPL-MCNC: 1.1 MG/DL (ref 0.5–1.4)
ERYTHROCYTE [DISTWIDTH] IN BLOOD BY AUTOMATED COUNT: 11.2 % (ref 11.5–14.5)
EST. GFR  (AFRICAN AMERICAN): >60 ML/MIN/1.73 M^2
EST. GFR  (NON AFRICAN AMERICAN): >60 ML/MIN/1.73 M^2
GLUCOSE SERPL-MCNC: 90 MG/DL (ref 70–110)
GLUCOSE SERPL-MCNC: 90 MG/DL (ref 70–110)
HBV SURFACE AG SERPL QL IA: NEGATIVE
HCT VFR BLD AUTO: 47.2 % (ref 40–54)
HCV AB SERPL QL IA: NEGATIVE
HDLC SERPL-MCNC: 66 MG/DL (ref 40–75)
HDLC SERPL: 28.1 % (ref 20–50)
HGB BLD-MCNC: 14.7 G/DL (ref 14–18)
HIV 1+2 AB+HIV1 P24 AG SERPL QL IA: NEGATIVE
LDLC SERPL CALC-MCNC: 155 MG/DL (ref 63–159)
MCH RBC QN AUTO: 28.3 PG (ref 27–31)
MCHC RBC AUTO-ENTMCNC: 31.1 G/DL (ref 32–36)
MCV RBC AUTO: 91 FL (ref 82–98)
NONHDLC SERPL-MCNC: 169 MG/DL
PLATELET # BLD AUTO: 145 K/UL (ref 150–350)
PMV BLD AUTO: 12.2 FL (ref 9.2–12.9)
POTASSIUM SERPL-SCNC: 4.2 MMOL/L (ref 3.5–5.1)
PROT SERPL-MCNC: 7 G/DL (ref 6–8.4)
RBC # BLD AUTO: 5.19 M/UL (ref 4.6–6.2)
SODIUM SERPL-SCNC: 141 MMOL/L (ref 136–145)
TRIGL SERPL-MCNC: 70 MG/DL (ref 30–150)
WBC # BLD AUTO: 9.9 K/UL (ref 3.9–12.7)

## 2020-02-17 PROCEDURE — 86592 SYPHILIS TEST NON-TREP QUAL: CPT

## 2020-02-17 PROCEDURE — 86803 HEPATITIS C AB TEST: CPT

## 2020-02-17 PROCEDURE — 85027 COMPLETE CBC AUTOMATED: CPT

## 2020-02-17 PROCEDURE — 87340 HEPATITIS B SURFACE AG IA: CPT

## 2020-02-17 PROCEDURE — 36415 COLL VENOUS BLD VENIPUNCTURE: CPT

## 2020-02-17 PROCEDURE — 86703 HIV-1/HIV-2 1 RESULT ANTBDY: CPT

## 2020-02-17 PROCEDURE — 82947 ASSAY GLUCOSE BLOOD QUANT: CPT

## 2020-02-17 PROCEDURE — 80053 COMPREHEN METABOLIC PANEL: CPT

## 2020-02-17 PROCEDURE — 80061 LIPID PANEL: CPT

## 2020-02-18 LAB — RPR SER QL: NORMAL

## 2020-02-19 ENCOUNTER — PATIENT MESSAGE (OUTPATIENT)
Dept: INTERNAL MEDICINE | Facility: CLINIC | Age: 40
End: 2020-02-19

## 2020-03-04 ENCOUNTER — TELEPHONE (OUTPATIENT)
Dept: INTERNAL MEDICINE | Facility: CLINIC | Age: 40
End: 2020-03-04

## 2020-03-04 ENCOUNTER — OFFICE VISIT (OUTPATIENT)
Dept: INTERNAL MEDICINE | Facility: CLINIC | Age: 40
End: 2020-03-04
Payer: COMMERCIAL

## 2020-03-04 VITALS
WEIGHT: 224 LBS | DIASTOLIC BLOOD PRESSURE: 80 MMHG | HEIGHT: 73 IN | BODY MASS INDEX: 29.69 KG/M2 | HEART RATE: 74 BPM | SYSTOLIC BLOOD PRESSURE: 112 MMHG

## 2020-03-04 DIAGNOSIS — E78.00 ELEVATED CHOLESTEROL: ICD-10-CM

## 2020-03-04 DIAGNOSIS — L29.9 PRURITUS: ICD-10-CM

## 2020-03-04 DIAGNOSIS — J45.20 MILD INTERMITTENT ASTHMA WITHOUT COMPLICATION: ICD-10-CM

## 2020-03-04 DIAGNOSIS — Z00.00 ANNUAL PHYSICAL EXAM: Primary | ICD-10-CM

## 2020-03-04 DIAGNOSIS — Z71.85 VACCINE COUNSELING: ICD-10-CM

## 2020-03-04 PROCEDURE — 99999 PR PBB SHADOW E&M-EST. PATIENT-LVL III: ICD-10-PCS | Mod: PBBFAC,,, | Performed by: INTERNAL MEDICINE

## 2020-03-04 PROCEDURE — 99999 PR PBB SHADOW E&M-EST. PATIENT-LVL III: CPT | Mod: PBBFAC,,, | Performed by: INTERNAL MEDICINE

## 2020-03-04 PROCEDURE — 99395 PR PREVENTIVE VISIT,EST,18-39: ICD-10-PCS | Mod: S$GLB,,, | Performed by: INTERNAL MEDICINE

## 2020-03-04 PROCEDURE — 99395 PREV VISIT EST AGE 18-39: CPT | Mod: S$GLB,,, | Performed by: INTERNAL MEDICINE

## 2020-03-04 RX ORDER — ALBUTEROL SULFATE 90 UG/1
2 AEROSOL, METERED RESPIRATORY (INHALATION) EVERY 4 HOURS PRN
Qty: 8 G | Refills: 5 | Status: SHIPPED | OUTPATIENT
Start: 2020-03-04 | End: 2020-08-24 | Stop reason: SDUPTHER

## 2020-03-04 NOTE — TELEPHONE ENCOUNTER
----- Message from Johnnie Harris MD sent at 3/4/2020 11:55 AM CST -----  I ordered a type and screen for the patient to be done today.  It looks like they only saritha the HSV test.  Please call the lab to see if they can include the type and screen with the blood work that they have available.

## 2020-03-04 NOTE — PATIENT INSTRUCTIONS
Eating habits that help lower cholesterol are ones that limit or avoid animal protein, namely meat, dairy, and eggs.  See if you can start decreasing the amount of these in your daily diet.  I usually encourage people to start by cutting down by half; for example, if you eat meat at two meals a day, see if you can cut it down to only 1 meal a day.  For meal ideas that avoid animal protein, please review the [employer] series of cookbooks.

## 2020-06-28 ENCOUNTER — TELEPHONE (OUTPATIENT)
Dept: SPORTS MEDICINE | Facility: CLINIC | Age: 40
End: 2020-06-28

## 2020-06-28 DIAGNOSIS — Z20.822 EXPOSURE TO COVID-19 VIRUS: Primary | ICD-10-CM

## 2020-06-30 ENCOUNTER — LAB VISIT (OUTPATIENT)
Dept: SPORTS MEDICINE | Facility: CLINIC | Age: 40
End: 2020-06-30
Payer: COMMERCIAL

## 2020-06-30 DIAGNOSIS — Z20.822 EXPOSURE TO COVID-19 VIRUS: ICD-10-CM

## 2020-06-30 PROCEDURE — U0003 INFECTIOUS AGENT DETECTION BY NUCLEIC ACID (DNA OR RNA); SEVERE ACUTE RESPIRATORY SYNDROME CORONAVIRUS 2 (SARS-COV-2) (CORONAVIRUS DISEASE [COVID-19]), AMPLIFIED PROBE TECHNIQUE, MAKING USE OF HIGH THROUGHPUT TECHNOLOGIES AS DESCRIBED BY CMS-2020-01-R: HCPCS

## 2020-06-30 NOTE — PROGRESS NOTES
COVID Screening Specimen Collected    POSTIVE for the following symptoms:  None    NEGATIVE for the following symptoms:  Fever  Cough  Shortness of breath  Difficulty breathing  GI symptoms such as diarrhea or nausea  Loss of taste  Loss of smell    Patient was given:  1.Instructions for Patients Awaiting COVID-19 Test Results  2. CDC: What to do if you are sick with coronavirus disease 2019 (COVID-19)      
no

## 2020-07-04 LAB — SARS-COV-2 RNA RESP QL NAA+PROBE: NEGATIVE

## 2020-07-31 ENCOUNTER — PATIENT OUTREACH (OUTPATIENT)
Dept: ADMINISTRATIVE | Facility: OTHER | Age: 40
End: 2020-07-31

## 2020-07-31 NOTE — PROGRESS NOTES
Requested updates within Care Everywhere.  Patient's chart was reviewed for overdue TOM topics.  Immunizations reconciled.    Orders placed:  Tasked appts:  Labs Linked:

## 2020-08-03 ENCOUNTER — OFFICE VISIT (OUTPATIENT)
Dept: OTOLARYNGOLOGY | Facility: CLINIC | Age: 40
End: 2020-08-03
Payer: COMMERCIAL

## 2020-08-03 ENCOUNTER — CLINICAL SUPPORT (OUTPATIENT)
Dept: OTOLARYNGOLOGY | Facility: CLINIC | Age: 40
End: 2020-08-03
Payer: COMMERCIAL

## 2020-08-03 VITALS
SYSTOLIC BLOOD PRESSURE: 117 MMHG | BODY MASS INDEX: 30.76 KG/M2 | HEART RATE: 72 BPM | DIASTOLIC BLOOD PRESSURE: 80 MMHG | HEIGHT: 73 IN | WEIGHT: 232.13 LBS

## 2020-08-03 DIAGNOSIS — H69.93 EUSTACHIAN TUBE DYSFUNCTION, BILATERAL: ICD-10-CM

## 2020-08-03 DIAGNOSIS — Z86.69 HISTORY OF OTITIS MEDIA: Primary | ICD-10-CM

## 2020-08-03 DIAGNOSIS — J32.4 CHRONIC PANSINUSITIS: Primary | ICD-10-CM

## 2020-08-03 DIAGNOSIS — J30.9 CHRONIC ALLERGIC RHINITIS: ICD-10-CM

## 2020-08-03 DIAGNOSIS — J33.9 NASAL POLYPOSIS: ICD-10-CM

## 2020-08-03 PROCEDURE — 92552 PURE TONE AUDIOMETRY AIR: CPT | Mod: S$GLB,,, | Performed by: AUDIOLOGIST-HEARING AID FITTER

## 2020-08-03 PROCEDURE — 99213 PR OFFICE/OUTPT VISIT, EST, LEVL III, 20-29 MIN: ICD-10-PCS | Mod: S$GLB,,, | Performed by: OTOLARYNGOLOGY

## 2020-08-03 PROCEDURE — 99213 OFFICE O/P EST LOW 20 MIN: CPT | Mod: S$GLB,,, | Performed by: OTOLARYNGOLOGY

## 2020-08-03 PROCEDURE — 92552 PR PURE TONE AUDIOMETRY, AIR: ICD-10-PCS | Mod: S$GLB,,, | Performed by: AUDIOLOGIST-HEARING AID FITTER

## 2020-08-03 PROCEDURE — 3008F PR BODY MASS INDEX (BMI) DOCUMENTED: ICD-10-PCS | Mod: CPTII,S$GLB,, | Performed by: OTOLARYNGOLOGY

## 2020-08-03 PROCEDURE — 3008F BODY MASS INDEX DOCD: CPT | Mod: CPTII,S$GLB,, | Performed by: OTOLARYNGOLOGY

## 2020-08-03 PROCEDURE — 92567 PR TYMPA2METRY: ICD-10-PCS | Mod: S$GLB,,, | Performed by: AUDIOLOGIST-HEARING AID FITTER

## 2020-08-03 PROCEDURE — 99999 PR PBB SHADOW E&M-EST. PATIENT-LVL I: ICD-10-PCS | Mod: PBBFAC,,, | Performed by: AUDIOLOGIST-HEARING AID FITTER

## 2020-08-03 PROCEDURE — 99999 PR PBB SHADOW E&M-EST. PATIENT-LVL IV: CPT | Mod: PBBFAC,,, | Performed by: OTOLARYNGOLOGY

## 2020-08-03 PROCEDURE — 99999 PR PBB SHADOW E&M-EST. PATIENT-LVL IV: ICD-10-PCS | Mod: PBBFAC,,, | Performed by: OTOLARYNGOLOGY

## 2020-08-03 PROCEDURE — 99999 PR PBB SHADOW E&M-EST. PATIENT-LVL I: CPT | Mod: PBBFAC,,, | Performed by: AUDIOLOGIST-HEARING AID FITTER

## 2020-08-03 PROCEDURE — 92567 TYMPANOMETRY: CPT | Mod: S$GLB,,, | Performed by: AUDIOLOGIST-HEARING AID FITTER

## 2020-08-03 NOTE — PROGRESS NOTES
Otolaryngology Clinic Note    Alpha English  Encounter Date: 8/3/2020   YOB: 1980  Referring Physician: No referring provider defined for this encounter.   PCP: Johnnie Harris MD    Chief Complaint:   Chief Complaint   Patient presents with    Ear Fullness     bilateral       HPI: Sarthak Caballero is a 40 y.o. male here for follow up of chronic sinusitis, nasal polyposis and history of conductive hearing loss. Last seen 1/21/20. His TM perforation had healed and hearing had returned to normal. History of revision FESS with Dr Mcneill in 10/2015. First surgery in 10/2013. No recent imaging. Was seeing Dr Nieto for allergy. Low pneumococcal titer with Pneumovax 2/11/2014, protective with Prevnar 3/25/2014. Was on Orolair but insurance stopped covering it. Was re-evaluated by him 1/24/20.     Today he reports his ears have been getting clogged on and off especially for the last few weeks. Feels is he able to pop them most of the time. No drainage. Still with nasal congestion and facial pressure - alternates sides. Has not gotten Oralair because it is too expensive. Stopped using nasal rinses because he feels it does not do anything. Had some left ear pain a week ago but has resolved. Hearing and ears feel better today.    Review of Systems   Constitutional: Negative for fever.   HENT: Positive for congestion, ear pain and sinus pain. Negative for ear discharge, hearing loss and tinnitus.         Review of patient's allergies indicates:   Allergen Reactions    Grass pollen-bermuda, standard Shortness Of Breath       Past Medical History:   Diagnosis Date    Allergy     Asthma     Environmental allergies        Past Surgical History:   Procedure Laterality Date    SINUS SURGERY  10/2015    FESS x2       Social History     Socioeconomic History    Marital status: Single     Spouse name: Not on file    Number of children: Not on file    Years of education: Not on file    Highest education level: Not on  file   Occupational History    Not on file   Social Needs    Financial resource strain: Not on file    Food insecurity     Worry: Not on file     Inability: Not on file    Transportation needs     Medical: Not on file     Non-medical: Not on file   Tobacco Use    Smoking status: Never Smoker    Smokeless tobacco: Never Used   Substance and Sexual Activity    Alcohol use: No    Drug use: No    Sexual activity: Not Currently     Partners: Female   Lifestyle    Physical activity     Days per week: Not on file     Minutes per session: Not on file    Stress: Not on file   Relationships    Social connections     Talks on phone: Not on file     Gets together: Not on file     Attends Cheondoism service: Not on file     Active member of club or organization: Not on file     Attends meetings of clubs or organizations: Not on file     Relationship status: Not on file   Other Topics Concern    Not on file   Social History Narrative    Not on file       Family History   Problem Relation Age of Onset    Hypertension Mother     Heart disease Father 69        stent    Cancer Maternal Aunt         Breast    Heart disease Maternal Uncle         heart surgery    Stroke Maternal Grandmother        Outpatient Encounter Medications as of 8/3/2020   Medication Sig Dispense Refill    albuterol (PROVENTIL/VENTOLIN HFA) 90 mcg/actuation inhaler Inhale 2 puffs into the lungs every 4 (four) hours as needed for Wheezing. 8 g 5    WIXELA INHUB 500-50 mcg/dose DsDv diskus inhaler TAKE 1 PUFF BY MOUTH TWICE A DAY 1 Inhaler 5    budesonide 1 mg/2 mL NbSp Inhale 1 ampule into the lungs 2 (two) times daily. 180 mL 0    EPINEPHrine (EPIPEN) 0.3 mg/0.3 mL AtIn Inject 0.3 mLs (0.3 mg total) into the muscle once. for 1 day 2 each 5    grass-orch-s.kamini-rye-Kblu-maru (ORALAIR) 300 indx reactivity Subl Place 1 tablet under the tongue once daily. (Patient not taking: Reported on 8/3/2020) 30 tablet 11    ofloxacin (OCUFLOX) 0.3 %  ophthalmic solution Apply 2-3 drops in right ear twice daily for two weeks (Patient not taking: Reported on 8/3/2020) 5 mL 1    olopatadine (PATANOL) 0.1 % ophthalmic solution Place 1 drop into both eyes 2 (two) times daily. 5 mL 6    prednisoLONE acetate (PRED FORTE) 1 % DrpS Apply 2-3 drops to right ear twice daily for 2 weeks (Patient not taking: Reported on 8/3/2020) 5 mL 1     No facility-administered encounter medications on file as of 8/3/2020.        Physical Exam:    Vitals:    08/03/20 1418   BP: 117/80   Pulse: 72       Constitutional  General Appearance: well nourished, well-developed, alert, oriented, in no acute distress  Communication: ability, understanding, voice quality normal  Head and Face  Inspection: normocephalic, atraumatic, no scars, lesions or masses    Eyes  Ocular Motility / Alignment: normal alignment, motility, no proptosis, enophthalmus or nystagmus  Conjunctiva: not injected  Eyelids: no entropion or ectropion, no edema  Ears  Hearing: speech reception thresholds grossly normal  External Ears: no auricle lesions, non-tender, mobile to palpation  Otoscopy:  Right Ear: no tympanic membrane lesions, perforations, or effusion, normal EAC  Left Ear: no tympanic membrane lesions, perforations, or effusion, normal EAC  Nose  External Nose: no lesions, tenderness, trauma or deformity  Intranasal Exam: left septal deviation, appears to have polyposis on the left but limited visualize with anterior rhinoscopy  Oral Cavity / Oropharynx  Lips: upper and lower lips pink and moist  Oral Mucosa: moist, no mucosal lesions  Tongue: moist, normal mobility, no lesions  Oropharynx: tonsils and walls without erythema, exudate, lesions, fullness or obstruction  Neck  Inspection and Palpation: no erythema, induration, emphysema, tenderness or masses  Chest / Respiratory  Chest: no stridor or retractions, normal effort and expansion  Neurological  General: no focal deficits  Psychiatric  Orientation:  oriented to time, place and person  Mood and Affect: no depression, anxiety or agitation    Procedures:  No notes on file    Pertinent Data:  ? LABS:   ? AUDIO:           ? PATH:  ? XRAY:    I personally reviewed the following pertinent data at today's visit: Audiogram. Interpretation by me - normal hearing, type A tymps      Imaging:   ? Ultrasound:  ? CT Scan:  ? MRI Scan:  ? PET/CT Scan:    I personally reviewed the following images:    Miscellaneous:     Flora Sleepiness Scale-     Reflux Symptom Index (RSI) -     Summary of Outside Records:      Assessment and Plan:  Sarthak Caballero is a 40 y.o. male with       Chronic pansinusitis    Nasal polyposis    Eustachian tube dysfunction, bilateral       S/p FESS x2 in 2013 and 2015. Most recent in 2015 with Dr Mcneill. Not scoped today since visit primarily related to ear complaints and did not have a pre-procedure COVID. Ears are clear today. Hearing normal limits. Suspect ETD likely exacerbated by chronic sinusitis. Instructed patient to get back in touch with Dr Nieto. He is still adamant about not having another surgery. Patient refuses nasal steroids because he feels they do not help at all. Due to improvement in symptoms today at least from an ear standpoint would not recommend steroid taper since it is not a permament solution and has risks. Told to call if symptoms flare up again but would defer today.         CARLOS Hartman MD  [facility]

## 2020-08-13 ENCOUNTER — PATIENT MESSAGE (OUTPATIENT)
Dept: INTERNAL MEDICINE | Facility: CLINIC | Age: 40
End: 2020-08-13

## 2020-08-24 ENCOUNTER — OFFICE VISIT (OUTPATIENT)
Dept: ALLERGY | Facility: CLINIC | Age: 40
End: 2020-08-24
Payer: COMMERCIAL

## 2020-08-24 VITALS — BODY MASS INDEX: 29.16 KG/M2 | TEMPERATURE: 98 F | HEIGHT: 73 IN | WEIGHT: 220 LBS

## 2020-08-24 DIAGNOSIS — J32.4 CHRONIC PANSINUSITIS: ICD-10-CM

## 2020-08-24 DIAGNOSIS — H10.13 ALLERGIC CONJUNCTIVITIS OF BOTH EYES: ICD-10-CM

## 2020-08-24 DIAGNOSIS — J30.1 SEASONAL ALLERGIC RHINITIS DUE TO POLLEN: ICD-10-CM

## 2020-08-24 DIAGNOSIS — J33.9 NASAL POLYPOSIS: Primary | ICD-10-CM

## 2020-08-24 DIAGNOSIS — J45.20 MILD INTERMITTENT ASTHMA WITHOUT COMPLICATION: ICD-10-CM

## 2020-08-24 PROCEDURE — 99214 PR OFFICE/OUTPT VISIT, EST, LEVL IV, 30-39 MIN: ICD-10-PCS | Mod: S$GLB,,, | Performed by: ALLERGY & IMMUNOLOGY

## 2020-08-24 PROCEDURE — 99999 PR PBB SHADOW E&M-EST. PATIENT-LVL III: CPT | Mod: PBBFAC,,, | Performed by: ALLERGY & IMMUNOLOGY

## 2020-08-24 PROCEDURE — 99214 OFFICE O/P EST MOD 30 MIN: CPT | Mod: S$GLB,,, | Performed by: ALLERGY & IMMUNOLOGY

## 2020-08-24 PROCEDURE — 99999 PR PBB SHADOW E&M-EST. PATIENT-LVL III: ICD-10-PCS | Mod: PBBFAC,,, | Performed by: ALLERGY & IMMUNOLOGY

## 2020-08-24 PROCEDURE — 3008F PR BODY MASS INDEX (BMI) DOCUMENTED: ICD-10-PCS | Mod: CPTII,S$GLB,, | Performed by: ALLERGY & IMMUNOLOGY

## 2020-08-24 PROCEDURE — 3008F BODY MASS INDEX DOCD: CPT | Mod: CPTII,S$GLB,, | Performed by: ALLERGY & IMMUNOLOGY

## 2020-08-24 RX ORDER — PREDNISONE 10 MG/1
TABLET ORAL
Qty: 35 TABLET | Refills: 0 | Status: SHIPPED | OUTPATIENT
Start: 2020-08-24 | End: 2020-09-21 | Stop reason: ALTCHOICE

## 2020-08-24 RX ORDER — ALBUTEROL SULFATE 90 UG/1
2 AEROSOL, METERED RESPIRATORY (INHALATION) EVERY 4 HOURS PRN
Qty: 8 G | Refills: 5 | Status: SHIPPED | OUTPATIENT
Start: 2020-08-24 | End: 2021-07-01 | Stop reason: SDUPTHER

## 2020-08-24 RX ORDER — ANTHOXANTHUM ODORATUM POLLEN, DACTYLIS GLOMERATA POLLEN, LOLIUM PERENNE POLLEN, PHLEUM PRATENSE POLLEN, AND POA PRATENSIS POLLEN 300; 300; 300; 300; 300 [IR]/1; [IR]/1; [IR]/1; [IR]/1; [IR]/1
1 TABLET, ORALLY DISINTEGRATING SUBLINGUAL DAILY
Qty: 30 TABLET | Refills: 11 | Status: SHIPPED | OUTPATIENT
Start: 2020-08-24 | End: 2021-03-15

## 2020-08-24 NOTE — PROGRESS NOTES
Sarthak Caballero returns to clinic today for continued evaluation allergic rhinitis, conjunctivitis, asthma, nasal polyposis.  He was last seen January 24, 2020.      After his last visit, he did see Dr. Del Castillo in ENT on August 3, 2020.  He has been having increased nasal congestion and the inability to breathe through his nose.  He has also had increased ear fullness and pressure with decreased hearing.  His hearing test that was done was normal.      He has had some mild dizziness particularly when he stands up or sits down.  It has been occurring every other day.    He continues to take Wilexa twice a day.  He does not need any albuterol.  His cough, wheezing, and shortness of breath, are controlled.    He is not taking any antihistamines or Patanol.    In the past he did not think that topical nasal steroids helped and he is not using.  He would start again.  He has used budesonide rinses in the past.  Xhance has been discussed with him.    He was doing well on Oralair and would like to restart if possible.  There were some issues with insurance coverage.    He is the  for [employer] and travels a lot.    His wife is scheduled to deliver their first child, a daughter, before September 1st.  He would like to feel well during this time and asks for prednisone.    [facility] ALLERGY QUESTIONNAIRE SHORT 8/20/2020   Facial swelling? No   Sinus pain? No   Sinus pressure? Yes   Ear discharge? No   Ear pain? No   Hearing loss? Yes   Nosebleeds? No   Postnasal drip? No   Sneezing? No   Runny nose? No   Congestion? Yes   Sore throat? No   Trouble swallowing? No   Voice change? No   Eye itching? No   Eye redness? No   Eye discharge? No   Eye pain?  No   Light sensitivity / light hurts the eyes? No   Cough? Yes   Wheezing? Yes   Shortness of breath? Yes   Apnea? No   Choking? No   Chest tightness? No   Rash? No   Color change of skin? No      Physical Examination:  General: Well-developed, well-nourished, no  acute distress.  Head: No sinus tenderness.  Eyes: Conjunctivae:  No bulbar or palpebral conjunctival injection.  Ears: EAC's with cerumen.  TM's not visualized.  No pre-auricular nodes.  Nose: Nasal Mucosa:  Edematous.  Septum: No apparent deviation.  Turbinates:  Normal.  Polyps/Mass:  Polyp on the left.  Teeth/Gums:  No bleeding noted.  Oropharynx: No exudates.  Neck: Supple without thyromegaly. No cervical lymphadenopathy.    Respiratory/Chest: Effort: Good.  Auscultation:  Clear.  Skin: Good turgor.  No urticaria or angioedema.  Neuro/Psych: Oriented x 3.    Laboratory 2014:  IgE level:  467.  ImmunoCAP:  Class IV:  Bahia.  Class III:  Vincent, Pecan.  Class II:  Oak, GERARD.  Class I:  DM, ME.    IgA:  127.  Ig.  IgM:  82.  Pneumococcal titers: Not protective.    Pneumovax was given on 2014.    Laboratory 3/11/2014:  Pneumococcal titers: Improved but not protected.    Laboratory 2014:  Pneumococcal titers: Protective.    Chest x-ray 2017: Normal.    Assessment:  1.  Allergic rhinitis.  2.  Allergic conjunctivitis.  3.  Allergic asthma, controlled.  4.  Nasal polyposis.  5.  S/P FESS with polypectomy 2013 and 2015.  6.  Low pneumococcal titer with Pneumovax 2014, protective with Prevnar 3/25/2014.  7.  Eustachian tube dysfunction.    Recommendations:  1.  Restart Oralair.  2.  Xhance 2 sprays each nostril BID.  3.  Continue  Wixela.  4.  OTC antihistamines as needed.  5.  Albuterol as needed.  6.  Patanol as needed.  7.  Start prednisone taper before wife's delivery.  8.  RTC in 2 weeks.  9.  Discussed Dupixent and subcutaneous immunotherapy.  He prefers to try Oralair again as it was effective when he was taking it.

## 2020-08-25 ENCOUNTER — TELEPHONE (OUTPATIENT)
Dept: PHARMACY | Facility: CLINIC | Age: 40
End: 2020-08-25

## 2020-09-20 ENCOUNTER — PATIENT OUTREACH (OUTPATIENT)
Dept: ADMINISTRATIVE | Facility: OTHER | Age: 40
End: 2020-09-20

## 2020-09-21 ENCOUNTER — OFFICE VISIT (OUTPATIENT)
Dept: ALLERGY | Facility: CLINIC | Age: 40
End: 2020-09-21
Payer: COMMERCIAL

## 2020-09-21 VITALS — HEIGHT: 73 IN | BODY MASS INDEX: 29.95 KG/M2 | WEIGHT: 226 LBS | TEMPERATURE: 98 F

## 2020-09-21 DIAGNOSIS — J33.9 NASAL POLYPOSIS: Primary | ICD-10-CM

## 2020-09-21 DIAGNOSIS — J45.20 MILD INTERMITTENT ASTHMA WITHOUT COMPLICATION: ICD-10-CM

## 2020-09-21 DIAGNOSIS — J32.4 CHRONIC PANSINUSITIS: ICD-10-CM

## 2020-09-21 DIAGNOSIS — H10.13 ALLERGIC CONJUNCTIVITIS OF BOTH EYES: ICD-10-CM

## 2020-09-21 DIAGNOSIS — J30.89 NON-SEASONAL ALLERGIC RHINITIS DUE TO OTHER ALLERGIC TRIGGER: ICD-10-CM

## 2020-09-21 PROCEDURE — 99999 PR PBB SHADOW E&M-EST. PATIENT-LVL III: ICD-10-PCS | Mod: PBBFAC,,, | Performed by: ALLERGY & IMMUNOLOGY

## 2020-09-21 PROCEDURE — 3008F BODY MASS INDEX DOCD: CPT | Mod: CPTII,S$GLB,, | Performed by: ALLERGY & IMMUNOLOGY

## 2020-09-21 PROCEDURE — 99214 OFFICE O/P EST MOD 30 MIN: CPT | Mod: S$GLB,,, | Performed by: ALLERGY & IMMUNOLOGY

## 2020-09-21 PROCEDURE — 99214 PR OFFICE/OUTPT VISIT, EST, LEVL IV, 30-39 MIN: ICD-10-PCS | Mod: S$GLB,,, | Performed by: ALLERGY & IMMUNOLOGY

## 2020-09-21 PROCEDURE — 99999 PR PBB SHADOW E&M-EST. PATIENT-LVL III: CPT | Mod: PBBFAC,,, | Performed by: ALLERGY & IMMUNOLOGY

## 2020-09-21 PROCEDURE — 3008F PR BODY MASS INDEX (BMI) DOCUMENTED: ICD-10-PCS | Mod: CPTII,S$GLB,, | Performed by: ALLERGY & IMMUNOLOGY

## 2020-09-21 NOTE — PROGRESS NOTES
Sarthak Caballero returns to clinic today for continued evaluation of allergic conjunctivitis, asthma, and nasal polyposis.  He is here alone.    After his last visit, he took a prednisone taper and his symptoms completely resolved.  Since he has discontinued this, he has started having a small amount of nasal congestion again.    He has not yet picked up Xhance but plans to today.  He is not taking other nasal steroids.    He is not needing any oral antihistamines.    He has not yet been called by [facility] regarding his Oralair.  He does plan to start this.  He thinks his symptoms were completely controlled on this.    He denies any cough, wheezing, or shortness of breath.  He continues to take Wilexa.  He has not needed any albuterol.    He does not want to start subcutaneous immunotherapy or Dupixent.    His wife had a  on 2020.  His daughter's name is Alejandro Caballero.    [facility] PE[facility] ALLERGY QUESTIONNAIRE SHORT 2020   Facial swelling? No   Sinus pain? No   Sinus pressure? No   Ear discharge? No   Ear pain? No   Hearing loss? No   Nosebleeds? No   Postnasal drip? No   Sneezing? No   Runny nose? No   Congestion? Yes   Sore throat? No   Trouble swallowing? No   Voice change? No   Eye itching? No   Eye redness? No   Eye discharge? No   Eye pain?  No   Light sensitivity / light hurts the eyes? No   Cough? Yes   Wheezing? Yes   Shortness of breath? No   Apnea? No   Choking? No   Chest tightness? No   Rash? No   Color change of skin? No     Physical Examination:  General: Well-developed, well-nourished, no acute distress.  Head: No sinus tenderness.  Eyes: Conjunctivae:  No bulbar or palpebral conjunctival injection.  Ears: EAC's with cerumen.  TM's not visualized.  No pre-auricular nodes.  Nose: Nasal Mucosa:  Edematous.  Septum: No apparent deviation.  Turbinates:  Normal.  Polyps/Mass:  Polyp on the left.  Teeth/Gums:  No bleeding noted.  Oropharynx: No exudates.  Neck:  Supple without thyromegaly. No cervical lymphadenopathy.    Respiratory/Chest: Effort: Good.  Auscultation:  Clear.  Skin: Good turgor.  No urticaria or angioedema.  Neuro/Psych: Oriented x 3.    Laboratory 2014:  IgE level:  467.  ImmunoCAP:  Class IV:  Bahia.  Class III:  Vincent, Pecan.  Class II:  Oak, RW.  Class I:  DM, ME.    IgA:  127.  Ig.  IgM:  82.  Pneumococcal titers: Not protective.    Pneumovax was given on 2014.    Laboratory 3/11/2014:  Pneumococcal titers: Improved but not protected.    Laboratory 2014:  Pneumococcal titers: Protective.    Chest x-ray 2017: Normal.    Assessment:  1.  Allergic rhinitis.  2.  Allergic conjunctivitis.  3.  Allergic asthma, controlled.  4.  Nasal polyposis, improved.  5.  S/P FESS with polypectomy 2013 and 2015.  6.  Low pneumococcal titer with Pneumovax 2014, protective with Prevnar 3/25/2014.  7.  Eustachian tube dysfunction.    Recommendations:  1.  Restart Oralair.  2.  Xhance 2 sprays each nostril BID.  3.  Continue Wixela.  4.  OTC antihistamines as needed.  5.  Albuterol as needed.  6.  Patanol as needed.  7.  Return to clinic in 4-8 weeks or sooner if needed.

## 2020-10-08 ENCOUNTER — TELEPHONE (OUTPATIENT)
Dept: SPORTS MEDICINE | Facility: CLINIC | Age: 40
End: 2020-10-08

## 2020-10-08 DIAGNOSIS — Z03.818 ENCOUNTER FOR OBSERVATION FOR SUSPECTED EXPOSURE TO OTHER BIOLOGICAL AGENTS RULED OUT: ICD-10-CM

## 2020-10-08 DIAGNOSIS — Z20.822 EXPOSURE TO COVID-19 VIRUS: Primary | ICD-10-CM

## 2020-10-14 ENCOUNTER — TELEPHONE (OUTPATIENT)
Dept: SPORTS MEDICINE | Facility: CLINIC | Age: 40
End: 2020-10-14

## 2020-10-14 DIAGNOSIS — Z20.822 EXPOSURE TO COVID-19 VIRUS: Primary | ICD-10-CM

## 2020-10-14 DIAGNOSIS — Z03.818 ENCOUNTER FOR OBSERVATION FOR SUSPECTED EXPOSURE TO OTHER BIOLOGICAL AGENTS RULED OUT: ICD-10-CM

## 2020-10-16 ENCOUNTER — LAB VISIT (OUTPATIENT)
Dept: SPORTS MEDICINE | Facility: CLINIC | Age: 40
End: 2020-10-16
Payer: COMMERCIAL

## 2020-10-16 DIAGNOSIS — Z20.822 EXPOSURE TO COVID-19 VIRUS: ICD-10-CM

## 2020-10-16 DIAGNOSIS — Z03.818 ENCOUNTER FOR OBSERVATION FOR SUSPECTED EXPOSURE TO OTHER BIOLOGICAL AGENTS RULED OUT: ICD-10-CM

## 2020-10-16 LAB — SARS-COV-2 RNA RESP QL NAA+PROBE: NOT DETECTED

## 2020-10-16 PROCEDURE — U0003 INFECTIOUS AGENT DETECTION BY NUCLEIC ACID (DNA OR RNA); SEVERE ACUTE RESPIRATORY SYNDROME CORONAVIRUS 2 (SARS-COV-2) (CORONAVIRUS DISEASE [COVID-19]), AMPLIFIED PROBE TECHNIQUE, MAKING USE OF HIGH THROUGHPUT TECHNOLOGIES AS DESCRIBED BY CMS-2020-01-R: HCPCS

## 2020-10-23 ENCOUNTER — LAB VISIT (OUTPATIENT)
Dept: SPORTS MEDICINE | Facility: CLINIC | Age: 40
End: 2020-10-23
Payer: COMMERCIAL

## 2020-10-23 DIAGNOSIS — Z20.822 EXPOSURE TO COVID-19 VIRUS: ICD-10-CM

## 2020-10-23 DIAGNOSIS — Z03.818 ENCOUNTER FOR OBSERVATION FOR SUSPECTED EXPOSURE TO OTHER BIOLOGICAL AGENTS RULED OUT: ICD-10-CM

## 2020-10-23 LAB — SARS-COV-2 RNA RESP QL NAA+PROBE: NOT DETECTED

## 2020-10-23 PROCEDURE — U0003 INFECTIOUS AGENT DETECTION BY NUCLEIC ACID (DNA OR RNA); SEVERE ACUTE RESPIRATORY SYNDROME CORONAVIRUS 2 (SARS-COV-2) (CORONAVIRUS DISEASE [COVID-19]), AMPLIFIED PROBE TECHNIQUE, MAKING USE OF HIGH THROUGHPUT TECHNOLOGIES AS DESCRIBED BY CMS-2020-01-R: HCPCS

## 2020-10-26 ENCOUNTER — TELEPHONE (OUTPATIENT)
Dept: SPORTS MEDICINE | Facility: CLINIC | Age: 40
End: 2020-10-26

## 2020-10-26 DIAGNOSIS — Z20.822 EXPOSURE TO COVID-19 VIRUS: Primary | ICD-10-CM

## 2020-10-26 DIAGNOSIS — Z03.818 ENCOUNTER FOR OBSERVATION FOR SUSPECTED EXPOSURE TO OTHER BIOLOGICAL AGENTS RULED OUT: ICD-10-CM

## 2020-10-30 ENCOUNTER — LAB VISIT (OUTPATIENT)
Dept: SPORTS MEDICINE | Facility: CLINIC | Age: 40
End: 2020-10-30
Payer: COMMERCIAL

## 2020-10-30 DIAGNOSIS — Z20.822 EXPOSURE TO COVID-19 VIRUS: ICD-10-CM

## 2020-10-30 DIAGNOSIS — Z03.818 ENCOUNTER FOR OBSERVATION FOR SUSPECTED EXPOSURE TO OTHER BIOLOGICAL AGENTS RULED OUT: ICD-10-CM

## 2020-10-30 PROCEDURE — U0003 INFECTIOUS AGENT DETECTION BY NUCLEIC ACID (DNA OR RNA); SEVERE ACUTE RESPIRATORY SYNDROME CORONAVIRUS 2 (SARS-COV-2) (CORONAVIRUS DISEASE [COVID-19]), AMPLIFIED PROBE TECHNIQUE, MAKING USE OF HIGH THROUGHPUT TECHNOLOGIES AS DESCRIBED BY CMS-2020-01-R: HCPCS

## 2020-10-31 LAB — SARS-COV-2 RNA RESP QL NAA+PROBE: NOT DETECTED

## 2020-11-04 ENCOUNTER — TELEPHONE (OUTPATIENT)
Dept: SPORTS MEDICINE | Facility: CLINIC | Age: 40
End: 2020-11-04

## 2020-11-04 DIAGNOSIS — Z20.822 EXPOSURE TO COVID-19 VIRUS: Primary | ICD-10-CM

## 2020-11-04 DIAGNOSIS — Z03.818 ENCOUNTER FOR OBSERVATION FOR SUSPECTED EXPOSURE TO OTHER BIOLOGICAL AGENTS RULED OUT: ICD-10-CM

## 2020-11-06 ENCOUNTER — LAB VISIT (OUTPATIENT)
Dept: SPORTS MEDICINE | Facility: CLINIC | Age: 40
End: 2020-11-06
Payer: COMMERCIAL

## 2020-11-06 DIAGNOSIS — Z03.818 ENCOUNTER FOR OBSERVATION FOR SUSPECTED EXPOSURE TO OTHER BIOLOGICAL AGENTS RULED OUT: ICD-10-CM

## 2020-11-06 DIAGNOSIS — Z20.822 EXPOSURE TO COVID-19 VIRUS: ICD-10-CM

## 2020-11-06 PROCEDURE — U0003 INFECTIOUS AGENT DETECTION BY NUCLEIC ACID (DNA OR RNA); SEVERE ACUTE RESPIRATORY SYNDROME CORONAVIRUS 2 (SARS-COV-2) (CORONAVIRUS DISEASE [COVID-19]), AMPLIFIED PROBE TECHNIQUE, MAKING USE OF HIGH THROUGHPUT TECHNOLOGIES AS DESCRIBED BY CMS-2020-01-R: HCPCS

## 2020-11-07 LAB — SARS-COV-2 RNA RESP QL NAA+PROBE: NOT DETECTED

## 2020-11-11 ENCOUNTER — TELEPHONE (OUTPATIENT)
Dept: SPORTS MEDICINE | Facility: CLINIC | Age: 40
End: 2020-11-11

## 2020-11-11 DIAGNOSIS — Z20.822 EXPOSURE TO COVID-19 VIRUS: Primary | ICD-10-CM

## 2020-11-11 DIAGNOSIS — Z03.818 ENCOUNTER FOR OBSERVATION FOR SUSPECTED EXPOSURE TO OTHER BIOLOGICAL AGENTS RULED OUT: ICD-10-CM

## 2020-11-13 ENCOUNTER — LAB VISIT (OUTPATIENT)
Dept: SPORTS MEDICINE | Facility: CLINIC | Age: 40
End: 2020-11-13
Payer: COMMERCIAL

## 2020-11-13 DIAGNOSIS — Z20.822 EXPOSURE TO COVID-19 VIRUS: ICD-10-CM

## 2020-11-13 DIAGNOSIS — Z03.818 ENCOUNTER FOR OBSERVATION FOR SUSPECTED EXPOSURE TO OTHER BIOLOGICAL AGENTS RULED OUT: ICD-10-CM

## 2020-11-13 PROCEDURE — U0003 INFECTIOUS AGENT DETECTION BY NUCLEIC ACID (DNA OR RNA); SEVERE ACUTE RESPIRATORY SYNDROME CORONAVIRUS 2 (SARS-COV-2) (CORONAVIRUS DISEASE [COVID-19]), AMPLIFIED PROBE TECHNIQUE, MAKING USE OF HIGH THROUGHPUT TECHNOLOGIES AS DESCRIBED BY CMS-2020-01-R: HCPCS

## 2020-11-15 LAB — SARS-COV-2 RNA RESP QL NAA+PROBE: NOT DETECTED

## 2020-11-18 ENCOUNTER — TELEPHONE (OUTPATIENT)
Dept: SPORTS MEDICINE | Facility: CLINIC | Age: 40
End: 2020-11-18

## 2020-11-18 DIAGNOSIS — Z03.818 ENCOUNTER FOR OBSERVATION FOR SUSPECTED EXPOSURE TO OTHER BIOLOGICAL AGENTS RULED OUT: ICD-10-CM

## 2020-11-18 DIAGNOSIS — Z20.822 EXPOSURE TO COVID-19 VIRUS: Primary | ICD-10-CM

## 2020-11-20 ENCOUNTER — LAB VISIT (OUTPATIENT)
Dept: SPORTS MEDICINE | Facility: CLINIC | Age: 40
End: 2020-11-20
Payer: COMMERCIAL

## 2020-11-20 DIAGNOSIS — Z20.822 EXPOSURE TO COVID-19 VIRUS: ICD-10-CM

## 2020-11-20 DIAGNOSIS — Z03.818 ENCOUNTER FOR OBSERVATION FOR SUSPECTED EXPOSURE TO OTHER BIOLOGICAL AGENTS RULED OUT: ICD-10-CM

## 2020-11-20 PROCEDURE — U0003 INFECTIOUS AGENT DETECTION BY NUCLEIC ACID (DNA OR RNA); SEVERE ACUTE RESPIRATORY SYNDROME CORONAVIRUS 2 (SARS-COV-2) (CORONAVIRUS DISEASE [COVID-19]), AMPLIFIED PROBE TECHNIQUE, MAKING USE OF HIGH THROUGHPUT TECHNOLOGIES AS DESCRIBED BY CMS-2020-01-R: HCPCS

## 2020-11-20 NOTE — PROGRESS NOTES
COVID Screening Specimen Collected    POSTIVE for the following symptoms:  None    NEGATIVE for the following symptoms:  Fever  Cough  Shortness of breath  Difficulty breathing  GI symptoms such as diarrhea or nausea  Loss of taste  Loss of smell    Patient was given:  1.Instructions for Patients Awaiting COVID-19 Test Results  2. CDC: What to do if you are sick with coronavirus disease 2019 (COVID-19)

## 2020-11-21 LAB — SARS-COV-2 RNA RESP QL NAA+PROBE: NOT DETECTED

## 2020-11-30 ENCOUNTER — LAB VISIT (OUTPATIENT)
Dept: SPORTS MEDICINE | Facility: CLINIC | Age: 40
End: 2020-11-30
Payer: COMMERCIAL

## 2020-11-30 DIAGNOSIS — Z03.818 ENCOUNTER FOR OBSERVATION FOR SUSPECTED EXPOSURE TO OTHER BIOLOGICAL AGENTS RULED OUT: ICD-10-CM

## 2020-11-30 DIAGNOSIS — Z20.822 EXPOSURE TO COVID-19 VIRUS: ICD-10-CM

## 2020-11-30 PROCEDURE — U0003 INFECTIOUS AGENT DETECTION BY NUCLEIC ACID (DNA OR RNA); SEVERE ACUTE RESPIRATORY SYNDROME CORONAVIRUS 2 (SARS-COV-2) (CORONAVIRUS DISEASE [COVID-19]), AMPLIFIED PROBE TECHNIQUE, MAKING USE OF HIGH THROUGHPUT TECHNOLOGIES AS DESCRIBED BY CMS-2020-01-R: HCPCS

## 2020-12-02 LAB — SARS-COV-2 RNA RESP QL NAA+PROBE: NOT DETECTED

## 2020-12-12 ENCOUNTER — LAB VISIT (OUTPATIENT)
Dept: SPORTS MEDICINE | Facility: CLINIC | Age: 40
End: 2020-12-12
Payer: COMMERCIAL

## 2020-12-12 DIAGNOSIS — Z03.818 ENCOUNTER FOR OBSERVATION FOR SUSPECTED EXPOSURE TO OTHER BIOLOGICAL AGENTS RULED OUT: ICD-10-CM

## 2020-12-12 DIAGNOSIS — Z20.822 EXPOSURE TO COVID-19 VIRUS: ICD-10-CM

## 2020-12-12 PROCEDURE — U0003 INFECTIOUS AGENT DETECTION BY NUCLEIC ACID (DNA OR RNA); SEVERE ACUTE RESPIRATORY SYNDROME CORONAVIRUS 2 (SARS-COV-2) (CORONAVIRUS DISEASE [COVID-19]), AMPLIFIED PROBE TECHNIQUE, MAKING USE OF HIGH THROUGHPUT TECHNOLOGIES AS DESCRIBED BY CMS-2020-01-R: HCPCS

## 2020-12-13 LAB — SARS-COV-2 RNA RESP QL NAA+PROBE: NOT DETECTED

## 2021-03-11 ENCOUNTER — PATIENT OUTREACH (OUTPATIENT)
Dept: ADMINISTRATIVE | Facility: OTHER | Age: 41
End: 2021-03-11

## 2021-03-15 ENCOUNTER — OFFICE VISIT (OUTPATIENT)
Dept: ALLERGY | Facility: CLINIC | Age: 41
End: 2021-03-15
Payer: COMMERCIAL

## 2021-03-15 ENCOUNTER — SPECIALTY PHARMACY (OUTPATIENT)
Dept: PHARMACY | Facility: CLINIC | Age: 41
End: 2021-03-15

## 2021-03-15 VITALS
WEIGHT: 223.13 LBS | HEIGHT: 73 IN | BODY MASS INDEX: 29.57 KG/M2 | SYSTOLIC BLOOD PRESSURE: 118 MMHG | DIASTOLIC BLOOD PRESSURE: 80 MMHG

## 2021-03-15 DIAGNOSIS — J30.1 SEASONAL ALLERGIC RHINITIS DUE TO POLLEN: ICD-10-CM

## 2021-03-15 PROCEDURE — 1126F AMNT PAIN NOTED NONE PRSNT: CPT | Mod: S$GLB,,, | Performed by: ALLERGY & IMMUNOLOGY

## 2021-03-15 PROCEDURE — 99999 PR PBB SHADOW E&M-EST. PATIENT-LVL III: CPT | Mod: PBBFAC,,, | Performed by: ALLERGY & IMMUNOLOGY

## 2021-03-15 PROCEDURE — 99999 PR PBB SHADOW E&M-EST. PATIENT-LVL III: ICD-10-PCS | Mod: PBBFAC,,, | Performed by: ALLERGY & IMMUNOLOGY

## 2021-03-15 PROCEDURE — 99214 PR OFFICE/OUTPT VISIT, EST, LEVL IV, 30-39 MIN: ICD-10-PCS | Mod: S$GLB,,, | Performed by: ALLERGY & IMMUNOLOGY

## 2021-03-15 PROCEDURE — 99214 OFFICE O/P EST MOD 30 MIN: CPT | Mod: S$GLB,,, | Performed by: ALLERGY & IMMUNOLOGY

## 2021-03-15 PROCEDURE — 1126F PR PAIN SEVERITY QUANTIFIED, NO PAIN PRESENT: ICD-10-PCS | Mod: S$GLB,,, | Performed by: ALLERGY & IMMUNOLOGY

## 2021-03-15 PROCEDURE — 3008F PR BODY MASS INDEX (BMI) DOCUMENTED: ICD-10-PCS | Mod: CPTII,S$GLB,, | Performed by: ALLERGY & IMMUNOLOGY

## 2021-03-15 PROCEDURE — 3008F BODY MASS INDEX DOCD: CPT | Mod: CPTII,S$GLB,, | Performed by: ALLERGY & IMMUNOLOGY

## 2021-03-15 RX ORDER — ANTHOXANTHUM ODORATUM POLLEN, DACTYLIS GLOMERATA POLLEN, LOLIUM PERENNE POLLEN, PHLEUM PRATENSE POLLEN, AND POA PRATENSIS POLLEN 300; 300; 300; 300; 300 [IR]/1; [IR]/1; [IR]/1; [IR]/1; [IR]/1
1 TABLET, ORALLY DISINTEGRATING SUBLINGUAL DAILY
Qty: 30 TABLET | Refills: 11 | Status: SHIPPED | OUTPATIENT
Start: 2021-03-15

## 2021-03-15 RX ORDER — PREDNISONE 10 MG/1
TABLET ORAL
Qty: 21 TABLET | Refills: 0 | Status: SHIPPED | OUTPATIENT
Start: 2021-03-15 | End: 2021-05-12

## 2021-03-18 ENCOUNTER — LAB VISIT (OUTPATIENT)
Dept: SPORTS MEDICINE | Facility: CLINIC | Age: 41
End: 2021-03-18
Payer: COMMERCIAL

## 2021-03-18 DIAGNOSIS — Z03.818 ENCOUNTER FOR OBSERVATION FOR SUSPECTED EXPOSURE TO OTHER BIOLOGICAL AGENTS RULED OUT: ICD-10-CM

## 2021-03-18 DIAGNOSIS — Z20.822 EXPOSURE TO COVID-19 VIRUS: ICD-10-CM

## 2021-03-18 PROCEDURE — U0005 INFEC AGEN DETEC AMPLI PROBE: HCPCS | Performed by: ORTHOPAEDIC SURGERY

## 2021-03-18 PROCEDURE — U0003 INFECTIOUS AGENT DETECTION BY NUCLEIC ACID (DNA OR RNA); SEVERE ACUTE RESPIRATORY SYNDROME CORONAVIRUS 2 (SARS-COV-2) (CORONAVIRUS DISEASE [COVID-19]), AMPLIFIED PROBE TECHNIQUE, MAKING USE OF HIGH THROUGHPUT TECHNOLOGIES AS DESCRIBED BY CMS-2020-01-R: HCPCS | Performed by: ORTHOPAEDIC SURGERY

## 2021-03-19 LAB — SARS-COV-2 RNA RESP QL NAA+PROBE: NOT DETECTED

## 2021-04-05 ENCOUNTER — PATIENT MESSAGE (OUTPATIENT)
Dept: ADMINISTRATIVE | Facility: HOSPITAL | Age: 41
End: 2021-04-05

## 2021-04-13 ENCOUNTER — PATIENT MESSAGE (OUTPATIENT)
Dept: RESEARCH | Facility: HOSPITAL | Age: 41
End: 2021-04-13

## 2021-05-03 RX ORDER — EPINEPHRINE 0.3 MG/.3ML
1 INJECTION SUBCUTANEOUS ONCE
Qty: 2 EACH | Refills: 5 | Status: SHIPPED | OUTPATIENT
Start: 2021-05-03 | End: 2021-05-03

## 2021-05-06 RX ORDER — EPINEPHRINE 0.3 MG/.3ML
2 INJECTION SUBCUTANEOUS ONCE
Qty: 0.6 ML | Refills: 0 | Status: SHIPPED | OUTPATIENT
Start: 2021-05-06 | End: 2021-06-25

## 2021-05-10 ENCOUNTER — TELEPHONE (OUTPATIENT)
Dept: ALLERGY | Facility: CLINIC | Age: 41
End: 2021-05-10

## 2021-05-12 ENCOUNTER — SPECIALTY PHARMACY (OUTPATIENT)
Dept: PHARMACY | Facility: CLINIC | Age: 41
End: 2021-05-12

## 2021-05-17 ENCOUNTER — TELEPHONE (OUTPATIENT)
Dept: ALLERGY | Facility: CLINIC | Age: 41
End: 2021-05-17

## 2021-05-17 ENCOUNTER — TELEPHONE (OUTPATIENT)
Dept: PHARMACY | Facility: CLINIC | Age: 41
End: 2021-05-17

## 2021-05-17 ENCOUNTER — DOCUMENTATION ONLY (OUTPATIENT)
Dept: ALLERGY | Facility: CLINIC | Age: 41
End: 2021-05-17

## 2021-06-01 ENCOUNTER — TELEPHONE (OUTPATIENT)
Dept: ALLERGY | Facility: CLINIC | Age: 41
End: 2021-06-01

## 2021-06-02 ENCOUNTER — TELEPHONE (OUTPATIENT)
Dept: SPORTS MEDICINE | Facility: CLINIC | Age: 41
End: 2021-06-02

## 2021-06-02 DIAGNOSIS — Z03.818 ENCOUNTER FOR OBSERVATION FOR SUSPECTED EXPOSURE TO OTHER BIOLOGICAL AGENTS RULED OUT: ICD-10-CM

## 2021-06-05 ENCOUNTER — LAB VISIT (OUTPATIENT)
Dept: SPORTS MEDICINE | Facility: CLINIC | Age: 41
End: 2021-06-05
Payer: COMMERCIAL

## 2021-06-05 DIAGNOSIS — Z03.818 ENCOUNTER FOR OBSERVATION FOR SUSPECTED EXPOSURE TO OTHER BIOLOGICAL AGENTS RULED OUT: ICD-10-CM

## 2021-06-05 LAB — SARS-COV-2 RNA RESP QL NAA+PROBE: NOT DETECTED

## 2021-06-05 PROCEDURE — U0005 INFEC AGEN DETEC AMPLI PROBE: HCPCS | Performed by: ORTHOPAEDIC SURGERY

## 2021-06-05 PROCEDURE — U0003 INFECTIOUS AGENT DETECTION BY NUCLEIC ACID (DNA OR RNA); SEVERE ACUTE RESPIRATORY SYNDROME CORONAVIRUS 2 (SARS-COV-2) (CORONAVIRUS DISEASE [COVID-19]), AMPLIFIED PROBE TECHNIQUE, MAKING USE OF HIGH THROUGHPUT TECHNOLOGIES AS DESCRIBED BY CMS-2020-01-R: HCPCS | Performed by: ORTHOPAEDIC SURGERY

## 2021-06-07 ENCOUNTER — SPECIALTY PHARMACY (OUTPATIENT)
Dept: PHARMACY | Facility: CLINIC | Age: 41
End: 2021-06-07

## 2021-06-25 ENCOUNTER — OFFICE VISIT (OUTPATIENT)
Dept: ALLERGY | Facility: CLINIC | Age: 41
End: 2021-06-25
Payer: COMMERCIAL

## 2021-06-25 VITALS — WEIGHT: 239.63 LBS | BODY MASS INDEX: 31.76 KG/M2 | HEIGHT: 73 IN

## 2021-06-25 DIAGNOSIS — J45.20 MILD INTERMITTENT ASTHMA WITHOUT COMPLICATION: ICD-10-CM

## 2021-06-25 DIAGNOSIS — J30.89 NON-SEASONAL ALLERGIC RHINITIS DUE TO OTHER ALLERGIC TRIGGER: ICD-10-CM

## 2021-06-25 DIAGNOSIS — H10.13 ALLERGIC CONJUNCTIVITIS OF BOTH EYES: ICD-10-CM

## 2021-06-25 DIAGNOSIS — J33.9 NASAL POLYPOSIS: Primary | ICD-10-CM

## 2021-06-25 DIAGNOSIS — J32.4 CHRONIC PANSINUSITIS: ICD-10-CM

## 2021-06-25 PROCEDURE — 99214 PR OFFICE/OUTPT VISIT, EST, LEVL IV, 30-39 MIN: ICD-10-PCS | Mod: S$GLB,,, | Performed by: ALLERGY & IMMUNOLOGY

## 2021-06-25 PROCEDURE — 99999 PR PBB SHADOW E&M-EST. PATIENT-LVL III: ICD-10-PCS | Mod: PBBFAC,,, | Performed by: ALLERGY & IMMUNOLOGY

## 2021-06-25 PROCEDURE — 99214 OFFICE O/P EST MOD 30 MIN: CPT | Mod: S$GLB,,, | Performed by: ALLERGY & IMMUNOLOGY

## 2021-06-25 PROCEDURE — 99999 PR PBB SHADOW E&M-EST. PATIENT-LVL III: CPT | Mod: PBBFAC,,, | Performed by: ALLERGY & IMMUNOLOGY

## 2021-06-25 PROCEDURE — 1126F PR PAIN SEVERITY QUANTIFIED, NO PAIN PRESENT: ICD-10-PCS | Mod: S$GLB,,, | Performed by: ALLERGY & IMMUNOLOGY

## 2021-06-25 PROCEDURE — 3008F BODY MASS INDEX DOCD: CPT | Mod: CPTII,S$GLB,, | Performed by: ALLERGY & IMMUNOLOGY

## 2021-06-25 PROCEDURE — 3008F PR BODY MASS INDEX (BMI) DOCUMENTED: ICD-10-PCS | Mod: CPTII,S$GLB,, | Performed by: ALLERGY & IMMUNOLOGY

## 2021-06-25 PROCEDURE — 1126F AMNT PAIN NOTED NONE PRSNT: CPT | Mod: S$GLB,,, | Performed by: ALLERGY & IMMUNOLOGY

## 2021-07-01 DIAGNOSIS — J45.20 MILD INTERMITTENT ASTHMA WITHOUT COMPLICATION: ICD-10-CM

## 2021-07-01 RX ORDER — ALBUTEROL SULFATE 90 UG/1
2 AEROSOL, METERED RESPIRATORY (INHALATION) EVERY 4 HOURS PRN
Qty: 8 G | Refills: 5 | Status: SHIPPED | OUTPATIENT
Start: 2021-07-01

## 2021-07-07 RX ORDER — FLUTICASONE PROPIONATE AND SALMETEROL 500; 50 UG/1; UG/1
POWDER RESPIRATORY (INHALATION)
Qty: 1 INHALER | Refills: 5 | Status: CANCELLED | OUTPATIENT
Start: 2021-07-07

## 2021-07-20 RX ORDER — FLUTICASONE PROPIONATE AND SALMETEROL 500; 50 UG/1; UG/1
POWDER RESPIRATORY (INHALATION)
Qty: 1 INHALER | Refills: 5 | Status: SHIPPED | OUTPATIENT
Start: 2021-07-20 | End: 2022-07-26

## 2021-07-28 ENCOUNTER — PATIENT MESSAGE (OUTPATIENT)
Dept: PHARMACY | Facility: CLINIC | Age: 41
End: 2021-07-28

## 2021-08-03 ENCOUNTER — SPECIALTY PHARMACY (OUTPATIENT)
Dept: PHARMACY | Facility: CLINIC | Age: 41
End: 2021-08-03

## 2021-08-03 ENCOUNTER — PATIENT MESSAGE (OUTPATIENT)
Dept: PHARMACY | Facility: CLINIC | Age: 41
End: 2021-08-03

## 2022-02-24 ENCOUNTER — TELEPHONE (OUTPATIENT)
Dept: RHEUMATOLOGY | Facility: CLINIC | Age: 42
End: 2022-02-24
Payer: COMMERCIAL

## 2022-02-24 RX ORDER — PREDNISONE 20 MG/1
40 TABLET ORAL DAILY
Qty: 10 TABLET | Refills: 0 | Status: SHIPPED | OUTPATIENT
Start: 2022-02-24 | End: 2022-03-01

## 2022-02-24 NOTE — TELEPHONE ENCOUNTER
Patient is requesting steroids to help nasal congestion. Patient will be traveling out of town on Friday and doesn't want his current situation to get any worse. Patient denies taking any oral antihistamines but is using flonase 2 sprays twice a day without any relief.

## 2023-04-19 ENCOUNTER — LAB VISIT (OUTPATIENT)
Dept: LAB | Facility: HOSPITAL | Age: 43
End: 2023-04-19
Attending: FAMILY MEDICINE
Payer: COMMERCIAL

## 2023-04-19 ENCOUNTER — PATIENT MESSAGE (OUTPATIENT)
Dept: FAMILY MEDICINE | Facility: CLINIC | Age: 43
End: 2023-04-19

## 2023-04-19 ENCOUNTER — OFFICE VISIT (OUTPATIENT)
Dept: FAMILY MEDICINE | Facility: CLINIC | Age: 43
End: 2023-04-19
Payer: COMMERCIAL

## 2023-04-19 VITALS
SYSTOLIC BLOOD PRESSURE: 118 MMHG | HEIGHT: 74 IN | TEMPERATURE: 98 F | OXYGEN SATURATION: 95 % | HEART RATE: 67 BPM | BODY MASS INDEX: 30.81 KG/M2 | DIASTOLIC BLOOD PRESSURE: 80 MMHG | WEIGHT: 240.06 LBS

## 2023-04-19 DIAGNOSIS — E66.09 CLASS 1 OBESITY DUE TO EXCESS CALORIES WITHOUT SERIOUS COMORBIDITY WITH BODY MASS INDEX (BMI) OF 31.0 TO 31.9 IN ADULT: ICD-10-CM

## 2023-04-19 DIAGNOSIS — Z76.89 ENCOUNTER TO ESTABLISH CARE WITH NEW DOCTOR: Primary | ICD-10-CM

## 2023-04-19 DIAGNOSIS — J45.20 MILD INTERMITTENT ASTHMA WITHOUT COMPLICATION: ICD-10-CM

## 2023-04-19 DIAGNOSIS — Z76.89 ENCOUNTER TO ESTABLISH CARE WITH NEW DOCTOR: ICD-10-CM

## 2023-04-19 LAB
ALBUMIN SERPL BCP-MCNC: 4.1 G/DL (ref 3.5–5.2)
ALP SERPL-CCNC: 90 U/L (ref 55–135)
ALT SERPL W/O P-5'-P-CCNC: 15 U/L (ref 10–44)
ANION GAP SERPL CALC-SCNC: 8 MMOL/L (ref 8–16)
AST SERPL-CCNC: 18 U/L (ref 10–40)
BASOPHILS # BLD AUTO: 0.07 K/UL (ref 0–0.2)
BASOPHILS NFR BLD: 1.5 % (ref 0–1.9)
BILIRUB SERPL-MCNC: 1.3 MG/DL (ref 0.1–1)
BUN SERPL-MCNC: 12 MG/DL (ref 6–20)
CALCIUM SERPL-MCNC: 9.4 MG/DL (ref 8.7–10.5)
CHLORIDE SERPL-SCNC: 107 MMOL/L (ref 95–110)
CHOLEST SERPL-MCNC: 242 MG/DL (ref 120–199)
CHOLEST/HDLC SERPL: 5 {RATIO} (ref 2–5)
CO2 SERPL-SCNC: 27 MMOL/L (ref 23–29)
CREAT SERPL-MCNC: 1.3 MG/DL (ref 0.5–1.4)
DIFFERENTIAL METHOD: ABNORMAL
EOSINOPHIL # BLD AUTO: 0.6 K/UL (ref 0–0.5)
EOSINOPHIL NFR BLD: 12.6 % (ref 0–8)
ERYTHROCYTE [DISTWIDTH] IN BLOOD BY AUTOMATED COUNT: 11.4 % (ref 11.5–14.5)
EST. GFR  (NO RACE VARIABLE): >60 ML/MIN/1.73 M^2
ESTIMATED AVG GLUCOSE: 88 MG/DL (ref 68–131)
GLUCOSE SERPL-MCNC: 99 MG/DL (ref 70–110)
HBA1C MFR BLD: 4.7 % (ref 4–5.6)
HCT VFR BLD AUTO: 45.1 % (ref 40–54)
HDLC SERPL-MCNC: 48 MG/DL (ref 40–75)
HDLC SERPL: 19.8 % (ref 20–50)
HGB BLD-MCNC: 14.8 G/DL (ref 14–18)
IMM GRANULOCYTES # BLD AUTO: 0.03 K/UL (ref 0–0.04)
IMM GRANULOCYTES NFR BLD AUTO: 0.6 % (ref 0–0.5)
LDLC SERPL CALC-MCNC: 182.8 MG/DL (ref 63–159)
LYMPHOCYTES # BLD AUTO: 1.6 K/UL (ref 1–4.8)
LYMPHOCYTES NFR BLD: 34.1 % (ref 18–48)
MCH RBC QN AUTO: 28.4 PG (ref 27–31)
MCHC RBC AUTO-ENTMCNC: 32.8 G/DL (ref 32–36)
MCV RBC AUTO: 86 FL (ref 82–98)
MONOCYTES # BLD AUTO: 0.4 K/UL (ref 0.3–1)
MONOCYTES NFR BLD: 8.7 % (ref 4–15)
NEUTROPHILS # BLD AUTO: 2 K/UL (ref 1.8–7.7)
NEUTROPHILS NFR BLD: 42.5 % (ref 38–73)
NONHDLC SERPL-MCNC: 194 MG/DL
NRBC BLD-RTO: 0 /100 WBC
PLATELET # BLD AUTO: 238 K/UL (ref 150–450)
PMV BLD AUTO: 10.7 FL (ref 9.2–12.9)
POTASSIUM SERPL-SCNC: 4.5 MMOL/L (ref 3.5–5.1)
PROT SERPL-MCNC: 7.1 G/DL (ref 6–8.4)
RBC # BLD AUTO: 5.22 M/UL (ref 4.6–6.2)
SODIUM SERPL-SCNC: 142 MMOL/L (ref 136–145)
TRIGL SERPL-MCNC: 56 MG/DL (ref 30–150)
TSH SERPL DL<=0.005 MIU/L-ACNC: 1.11 UIU/ML (ref 0.4–4)
WBC # BLD AUTO: 4.69 K/UL (ref 3.9–12.7)

## 2023-04-19 PROCEDURE — 3008F BODY MASS INDEX DOCD: CPT | Mod: CPTII,S$GLB,, | Performed by: FAMILY MEDICINE

## 2023-04-19 PROCEDURE — 99999 PR PBB SHADOW E&M-EST. PATIENT-LVL IV: CPT | Mod: PBBFAC,,, | Performed by: FAMILY MEDICINE

## 2023-04-19 PROCEDURE — 99204 PR OFFICE/OUTPT VISIT, NEW, LEVL IV, 45-59 MIN: ICD-10-PCS | Mod: S$GLB,,, | Performed by: FAMILY MEDICINE

## 2023-04-19 PROCEDURE — 3079F DIAST BP 80-89 MM HG: CPT | Mod: CPTII,S$GLB,, | Performed by: FAMILY MEDICINE

## 2023-04-19 PROCEDURE — 85025 COMPLETE CBC W/AUTO DIFF WBC: CPT | Performed by: FAMILY MEDICINE

## 2023-04-19 PROCEDURE — 3074F SYST BP LT 130 MM HG: CPT | Mod: CPTII,S$GLB,, | Performed by: FAMILY MEDICINE

## 2023-04-19 PROCEDURE — 84443 ASSAY THYROID STIM HORMONE: CPT | Performed by: FAMILY MEDICINE

## 2023-04-19 PROCEDURE — 3079F PR MOST RECENT DIASTOLIC BLOOD PRESSURE 80-89 MM HG: ICD-10-PCS | Mod: CPTII,S$GLB,, | Performed by: FAMILY MEDICINE

## 2023-04-19 PROCEDURE — 99999 PR PBB SHADOW E&M-EST. PATIENT-LVL IV: ICD-10-PCS | Mod: PBBFAC,,, | Performed by: FAMILY MEDICINE

## 2023-04-19 PROCEDURE — 3008F PR BODY MASS INDEX (BMI) DOCUMENTED: ICD-10-PCS | Mod: CPTII,S$GLB,, | Performed by: FAMILY MEDICINE

## 2023-04-19 PROCEDURE — 80053 COMPREHEN METABOLIC PANEL: CPT | Performed by: FAMILY MEDICINE

## 2023-04-19 PROCEDURE — 36415 COLL VENOUS BLD VENIPUNCTURE: CPT | Performed by: FAMILY MEDICINE

## 2023-04-19 PROCEDURE — 1159F PR MEDICATION LIST DOCUMENTED IN MEDICAL RECORD: ICD-10-PCS | Mod: CPTII,S$GLB,, | Performed by: FAMILY MEDICINE

## 2023-04-19 PROCEDURE — 99204 OFFICE O/P NEW MOD 45 MIN: CPT | Mod: S$GLB,,, | Performed by: FAMILY MEDICINE

## 2023-04-19 PROCEDURE — 3074F PR MOST RECENT SYSTOLIC BLOOD PRESSURE < 130 MM HG: ICD-10-PCS | Mod: CPTII,S$GLB,, | Performed by: FAMILY MEDICINE

## 2023-04-19 PROCEDURE — 83036 HEMOGLOBIN GLYCOSYLATED A1C: CPT | Performed by: FAMILY MEDICINE

## 2023-04-19 PROCEDURE — 1159F MED LIST DOCD IN RCRD: CPT | Mod: CPTII,S$GLB,, | Performed by: FAMILY MEDICINE

## 2023-04-19 PROCEDURE — 80061 LIPID PANEL: CPT | Performed by: FAMILY MEDICINE

## 2023-04-19 NOTE — PROGRESS NOTES
(Portions of this note were dictated using voice recognition software and may contain dictation related errors in spelling/grammar/syntax not found on text review)    CC:   Chief Complaint   Patient presents with    [facility]     Pt wants labs today       HPI: 42 y.o. male presented to [facility] today. He has medical history significant for mild intermittent asthma, environmental allergies, reports taking albuterol as needed and Wixela inhaler daily, symptoms are controlled.  He is due for annual labs and wants to have complete panel done.  He does not smoke, has no toxic habits.  He is physically active and tries to do regular workout in gym twice a week.  He denies having any other symptoms including cough, shortness of breath, chest pain, nausea, vomiting, abdominal pain, changes in bowel habits, urine problems including burning and dysuria.  He has no other concerns.    Past Medical History:   Diagnosis Date    Allergy     Asthma     Environmental allergies        Past Surgical History:   Procedure Laterality Date    SINUS SURGERY  10/2015    FESS x2       Family History   Problem Relation Age of Onset    Hypertension Mother     Heart disease Father 69        stent    Cancer Maternal Aunt         Breast    Heart disease Maternal Uncle         heart surgery    Stroke Maternal Grandmother        Social History     Tobacco Use    Smoking status: Never    Smokeless tobacco: Never   Substance Use Topics    Alcohol use: No    Drug use: No       Lab Results   Component Value Date    WBC 9.90 02/17/2020    HGB 14.7 02/17/2020    HCT 47.2 02/17/2020    MCV 91 02/17/2020     (L) 02/17/2020    CHOL 235 (H) 02/17/2020    TRIG 70 02/17/2020    HDL 66 02/17/2020    ALT 22 02/17/2020    AST 16 02/17/2020    BILITOT 1.0 02/17/2020    ALKPHOS 100 02/17/2020     02/17/2020    K 4.2 02/17/2020     02/17/2020    CREATININE 1.1 02/17/2020    ESTGFRAFRICA >60 02/17/2020    EGFRNONAA >60 02/17/2020     CALCIUM 9.6 02/17/2020    ALBUMIN 3.8 02/17/2020    BUN 14 02/17/2020    CO2 27 02/17/2020    TSH 1.675 11/19/2018    HGBA1C 4.5 11/19/2018    LDLCALC 155.0 02/17/2020    GLU 90 02/17/2020             Vital signs reviewed  PE:   APPEARANCE: Well nourished, well developed, in no acute distress.    HEAD: Normocephalic, atraumatic.  EYES: EOMI.  Conjunctivae noninjected.   NECK: Supple with no cervical lymphadenopathy.    CHEST: Good inspiratory effort. Lungs clear to auscultation with no wheezes or crackles.  CARDIOVASCULAR: Normal S1, S2. No rubs, murmurs, or gallops.  ABDOMEN: Bowel sounds normal. Not distended. Soft. No tenderness or masses. No organomegaly.  EXTREMITIES: No edema, cyanosis, or clubbing.    Review of Systems   Constitutional:  Negative for chills, fatigue and fever.   HENT: Negative.     Respiratory:  Negative for cough, shortness of breath and wheezing.    Cardiovascular:  Negative for chest pain, palpitations and leg swelling.   Gastrointestinal: Negative.    Genitourinary: Negative.    Musculoskeletal: Negative.    Neurological: Negative.    Psychiatric/Behavioral: Negative.     All other systems reviewed and are negative.    IMPRESSION  1. Encounter to establish care with new doctor    2. Mild intermittent asthma without complication    3. Class 1 obesity due to excess calories without serious comorbidity with body mass index (BMI) of 31.0 to 31.9 in adult            PLAN      1. Encounter to establish care with new doctor    - CBC Auto Differential; Future  - Comprehensive Metabolic Panel; Future  - TSH; Future  - Lipid Panel; Future  - Hemoglobin A1C; Future      2. Mild intermittent asthma without complication    Albuterol as needed  Continue Wixela inhaler bid      3. Class 1 obesity due to excess calories without serious comorbidity with body mass index (BMI) of 31.0 to 31.9 in adult    BMI 31    Counseling provided on healthy lifestyle, encouraged to do moderate intensity regular exercise  30 minutes every day 5 days a week, to include vegetables and fruits and cut back on saturated fats and carbohydrates.          SCREENINGS      Immunizations:   Denies all immunizations      Age/demographic appropriate health maintenance:    Health Maintenance Due   Topic Date Due    Hemoglobin A1c (Diabetic Prevention Screening)  11/19/2021         F/U roberto Saini   4/19/2023